# Patient Record
Sex: MALE | Race: WHITE | NOT HISPANIC OR LATINO
[De-identification: names, ages, dates, MRNs, and addresses within clinical notes are randomized per-mention and may not be internally consistent; named-entity substitution may affect disease eponyms.]

---

## 2020-10-20 ENCOUNTER — APPOINTMENT (OUTPATIENT)
Dept: INTERNAL MEDICINE | Facility: CLINIC | Age: 80
End: 2020-10-20
Payer: MEDICARE

## 2020-10-20 VITALS
SYSTOLIC BLOOD PRESSURE: 135 MMHG | WEIGHT: 177 LBS | RESPIRATION RATE: 12 BRPM | HEIGHT: 71 IN | BODY MASS INDEX: 24.78 KG/M2 | OXYGEN SATURATION: 96 % | HEART RATE: 85 BPM | DIASTOLIC BLOOD PRESSURE: 90 MMHG

## 2020-10-20 VITALS — SYSTOLIC BLOOD PRESSURE: 110 MMHG | DIASTOLIC BLOOD PRESSURE: 70 MMHG

## 2020-10-20 DIAGNOSIS — Z86.39 PERSONAL HISTORY OF OTHER ENDOCRINE, NUTRITIONAL AND METABOLIC DISEASE: ICD-10-CM

## 2020-10-20 DIAGNOSIS — Z92.3 PERSONAL HISTORY OF IRRADIATION: ICD-10-CM

## 2020-10-20 DIAGNOSIS — Z85.46 PERSONAL HISTORY OF MALIGNANT NEOPLASM OF PROSTATE: ICD-10-CM

## 2020-10-20 PROCEDURE — G0008: CPT

## 2020-10-20 PROCEDURE — 90662 IIV NO PRSV INCREASED AG IM: CPT

## 2020-10-20 PROCEDURE — 99213 OFFICE O/P EST LOW 20 MIN: CPT | Mod: 25

## 2020-10-20 NOTE — PHYSICAL EXAM
[No Acute Distress] : no acute distress [No Lymphadenopathy] : no lymphadenopathy [Thyroid Normal, No Nodules] : the thyroid was normal and there were no nodules present [No Carotid Bruits] : no carotid bruits [No Edema] : there was no peripheral edema [Normal] : soft, non-tender, non-distended, no masses palpated, no HSM and normal bowel sounds [Speech Grossly Normal] : speech grossly normal [Normal Mood] : the mood was normal [Normal Insight/Judgement] : insight and judgment were intact

## 2020-10-20 NOTE — HISTORY OF PRESENT ILLNESS
[FreeTextEntry1] : ROUTINE CLINIC FOLLOW UP [de-identified] : PATIENT W/ H/O ANXIETY FOR ROUTINE CLINIC FOLLOW UP AND FLU VACCINE , HE FEELS WELL BUT STILL C/O ANXIETY AT TIMES

## 2020-10-20 NOTE — REVIEW OF SYSTEMS
[Chest Pain] : no chest pain [Shortness Of Breath] : no shortness of breath [Orthopena] : no orthopnea [Abdominal Pain] : no abdominal pain [Negative] : Constitutional

## 2020-11-23 ENCOUNTER — APPOINTMENT (OUTPATIENT)
Dept: INTERNAL MEDICINE | Facility: CLINIC | Age: 80
End: 2020-11-23
Payer: MEDICARE

## 2020-11-23 ENCOUNTER — LABORATORY RESULT (OUTPATIENT)
Age: 80
End: 2020-11-23

## 2020-11-23 ENCOUNTER — NON-APPOINTMENT (OUTPATIENT)
Age: 80
End: 2020-11-23

## 2020-11-23 VITALS
HEIGHT: 71 IN | DIASTOLIC BLOOD PRESSURE: 83 MMHG | HEART RATE: 71 BPM | RESPIRATION RATE: 12 BRPM | BODY MASS INDEX: 24.52 KG/M2 | WEIGHT: 175.13 LBS | SYSTOLIC BLOOD PRESSURE: 124 MMHG | OXYGEN SATURATION: 98 %

## 2020-11-23 PROCEDURE — 99497 ADVNCD CARE PLAN 30 MIN: CPT | Mod: 33

## 2020-11-23 PROCEDURE — 36415 COLL VENOUS BLD VENIPUNCTURE: CPT

## 2020-11-23 PROCEDURE — G0439: CPT

## 2020-11-23 PROCEDURE — G0444 DEPRESSION SCREEN ANNUAL: CPT | Mod: 59

## 2020-11-23 PROCEDURE — 93000 ELECTROCARDIOGRAM COMPLETE: CPT | Mod: 59

## 2020-11-23 NOTE — HEALTH RISK ASSESSMENT
[0] : 2) Feeling down, depressed, or hopeless: Not at all (0) [Patient reported colonoscopy was normal] : Patient reported colonoscopy was normal [[id number]] : 0 [ColonoscopyComments] : APPROX 5 YEARS AGO [AdvancecareDate] : 11/23/2020 [FreeTextEntry4] : DISCUSSED ADVANCE CARE PLAN W/ PATIENT , FORMS GIVEN

## 2020-11-23 NOTE — PHYSICAL EXAM
[No Acute Distress] : no acute distress [PERRL] : pupils equal round and reactive to light [EOMI] : extraocular movements intact [Normal Outer Ear/Nose] : the outer ears and nose were normal in appearance [No Lymphadenopathy] : no lymphadenopathy [Thyroid Normal, No Nodules] : the thyroid was normal and there were no nodules present [Normal Rate] : normal rate  [Regular Rhythm] : with a regular rhythm [No Carotid Bruits] : no carotid bruits [No Abdominal Bruit] : a ~M bruit was not heard ~T in the abdomen [Normal Appearance] : normal in appearance [No Masses] : no palpable masses [Normal] : soft, non-tender, non-distended, no masses palpated, no HSM and normal bowel sounds [Declined Rectal Exam] : declined rectal exam [Normal Supraclavicular Nodes] : no supraclavicular lymphadenopathy [Normal Axillary Nodes] : no axillary lymphadenopathy [Normal Posterior Cervical Nodes] : no posterior cervical lymphadenopathy [Normal Anterior Cervical Nodes] : no anterior cervical lymphadenopathy [Normal Inguinal Nodes] : no inguinal lymphadenopathy [No CVA Tenderness] : no CVA  tenderness [No Focal Deficits] : no focal deficits [de-identified] : = 1-2 /6 systolic murmur at apex  [de-identified] : no ssupicious skin lesions

## 2020-11-24 ENCOUNTER — LABORATORY RESULT (OUTPATIENT)
Age: 80
End: 2020-11-24

## 2020-11-24 LAB
ALBUMIN SERPL ELPH-MCNC: 4.3 G/DL
ALP BLD-CCNC: 128 U/L
ALT SERPL-CCNC: 25 U/L
ANION GAP SERPL CALC-SCNC: 11 MMOL/L
APPEARANCE: CLEAR
AST SERPL-CCNC: 33 U/L
BACTERIA: NEGATIVE
BASOPHILS # BLD AUTO: 0 K/UL
BASOPHILS NFR BLD AUTO: 0 %
BILIRUB SERPL-MCNC: 0.8 MG/DL
BILIRUBIN URINE: NEGATIVE
BLOOD URINE: NEGATIVE
BUN SERPL-MCNC: 17 MG/DL
CALCIUM SERPL-MCNC: 9.5 MG/DL
CHLORIDE SERPL-SCNC: 102 MMOL/L
CHOLEST SERPL-MCNC: 181 MG/DL
CO2 SERPL-SCNC: 29 MMOL/L
COLOR: YELLOW
CREAT SERPL-MCNC: 1.04 MG/DL
EOSINOPHIL # BLD AUTO: 0.04 K/UL
EOSINOPHIL NFR BLD AUTO: 1.7 %
GLUCOSE QUALITATIVE U: NEGATIVE
GLUCOSE SERPL-MCNC: 99 MG/DL
HCT VFR BLD CALC: 43.4 %
HDLC SERPL-MCNC: 62 MG/DL
HGB BLD-MCNC: 14.3 G/DL
HYALINE CASTS: 0 /LPF
KETONES URINE: NEGATIVE
LDLC SERPL CALC-MCNC: 104 MG/DL
LEUKOCYTE ESTERASE URINE: NEGATIVE
LYMPHOCYTES # BLD AUTO: 0.81 K/UL
LYMPHOCYTES NFR BLD AUTO: 31.3 %
MAN DIFF?: NORMAL
MCHC RBC-ENTMCNC: 29.8 PG
MCHC RBC-ENTMCNC: 32.9 GM/DL
MCV RBC AUTO: 90.4 FL
MICROSCOPIC-UA: NORMAL
MONOCYTES # BLD AUTO: 0.23 K/UL
MONOCYTES NFR BLD AUTO: 8.7 %
NEUTROPHILS # BLD AUTO: 1.51 K/UL
NEUTROPHILS NFR BLD AUTO: 58.3 %
NITRITE URINE: NEGATIVE
NONHDLC SERPL-MCNC: 119 MG/DL
PH URINE: 6.5
PLATELET # BLD AUTO: 113 K/UL
POTASSIUM SERPL-SCNC: 4.2 MMOL/L
PROT SERPL-MCNC: 6.5 G/DL
PROTEIN URINE: NORMAL
PSA SERPL-MCNC: 1.51 NG/ML
RBC # BLD: 4.8 M/UL
RBC # FLD: 12.9 %
RED BLOOD CELLS URINE: 1 /HPF
SODIUM SERPL-SCNC: 142 MMOL/L
SPECIFIC GRAVITY URINE: 1.02
SQUAMOUS EPITHELIAL CELLS: 0 /HPF
TRIGL SERPL-MCNC: 73 MG/DL
UROBILINOGEN URINE: NORMAL
WBC # FLD AUTO: 2.59 K/UL
WHITE BLOOD CELLS URINE: 0 /HPF

## 2021-03-08 ENCOUNTER — INPATIENT (INPATIENT)
Facility: HOSPITAL | Age: 81
LOS: 2 days | Discharge: HOME | DRG: 291 | End: 2021-03-11
Attending: FAMILY MEDICINE | Admitting: FAMILY MEDICINE
Payer: MEDICARE

## 2021-03-08 ENCOUNTER — NON-APPOINTMENT (OUTPATIENT)
Age: 81
End: 2021-03-08

## 2021-03-08 VITALS
TEMPERATURE: 97 F | HEART RATE: 144 BPM | WEIGHT: 179.9 LBS | RESPIRATION RATE: 18 BRPM | OXYGEN SATURATION: 95 % | HEIGHT: 71 IN | DIASTOLIC BLOOD PRESSURE: 87 MMHG | SYSTOLIC BLOOD PRESSURE: 113 MMHG

## 2021-03-08 DIAGNOSIS — Z98.890 OTHER SPECIFIED POSTPROCEDURAL STATES: Chronic | ICD-10-CM

## 2021-03-08 DIAGNOSIS — D69.6 THROMBOCYTOPENIA, UNSPECIFIED: ICD-10-CM

## 2021-03-08 DIAGNOSIS — Z29.9 ENCOUNTER FOR PROPHYLACTIC MEASURES, UNSPECIFIED: ICD-10-CM

## 2021-03-08 DIAGNOSIS — R77.8 OTHER SPECIFIED ABNORMALITIES OF PLASMA PROTEINS: ICD-10-CM

## 2021-03-08 DIAGNOSIS — I48.91 UNSPECIFIED ATRIAL FIBRILLATION: ICD-10-CM

## 2021-03-08 DIAGNOSIS — I50.33 ACUTE ON CHRONIC DIASTOLIC (CONGESTIVE) HEART FAILURE: ICD-10-CM

## 2021-03-08 DIAGNOSIS — F41.9 ANXIETY DISORDER, UNSPECIFIED: ICD-10-CM

## 2021-03-08 DIAGNOSIS — J96.01 ACUTE RESPIRATORY FAILURE WITH HYPOXIA: ICD-10-CM

## 2021-03-08 DIAGNOSIS — Z98.89 OTHER SPECIFIED POSTPROCEDURAL STATES: Chronic | ICD-10-CM

## 2021-03-08 DIAGNOSIS — I50.9 HEART FAILURE, UNSPECIFIED: ICD-10-CM

## 2021-03-08 LAB
ALBUMIN SERPL ELPH-MCNC: 3.5 G/DL — SIGNIFICANT CHANGE UP (ref 3.3–5)
ALP SERPL-CCNC: 123 U/L — HIGH (ref 40–120)
ALT FLD-CCNC: 66 U/L — SIGNIFICANT CHANGE UP (ref 12–78)
ANION GAP SERPL CALC-SCNC: 4 MMOL/L — LOW (ref 5–17)
APTT BLD: 33.3 SEC — SIGNIFICANT CHANGE UP (ref 27.5–35.5)
AST SERPL-CCNC: 50 U/L — HIGH (ref 15–37)
BASOPHILS # BLD AUTO: 0.03 K/UL — SIGNIFICANT CHANGE UP (ref 0–0.2)
BASOPHILS NFR BLD AUTO: 0.5 % — SIGNIFICANT CHANGE UP (ref 0–2)
BILIRUB SERPL-MCNC: 0.9 MG/DL — SIGNIFICANT CHANGE UP (ref 0.2–1.2)
BUN SERPL-MCNC: 19 MG/DL — SIGNIFICANT CHANGE UP (ref 7–23)
CALCIUM SERPL-MCNC: 9.4 MG/DL — SIGNIFICANT CHANGE UP (ref 8.5–10.1)
CHLORIDE SERPL-SCNC: 106 MMOL/L — SIGNIFICANT CHANGE UP (ref 96–108)
CK MB BLD-MCNC: 2.3 % — SIGNIFICANT CHANGE UP (ref 0–3.5)
CK MB CFR SERPL CALC: 6.7 NG/ML — HIGH (ref 0–3.6)
CK MB CFR SERPL CALC: 7.1 NG/ML — HIGH (ref 0–3.6)
CK SERPL-CCNC: 288 U/L — SIGNIFICANT CHANGE UP (ref 26–308)
CO2 SERPL-SCNC: 30 MMOL/L — SIGNIFICANT CHANGE UP (ref 22–31)
CREAT SERPL-MCNC: 1.3 MG/DL — SIGNIFICANT CHANGE UP (ref 0.5–1.3)
EOSINOPHIL # BLD AUTO: 0.04 K/UL — SIGNIFICANT CHANGE UP (ref 0–0.5)
EOSINOPHIL NFR BLD AUTO: 0.7 % — SIGNIFICANT CHANGE UP (ref 0–6)
GLUCOSE SERPL-MCNC: 107 MG/DL — HIGH (ref 70–99)
HCT VFR BLD CALC: 40.6 % — SIGNIFICANT CHANGE UP (ref 39–50)
HGB BLD-MCNC: 13.6 G/DL — SIGNIFICANT CHANGE UP (ref 13–17)
IMM GRANULOCYTES NFR BLD AUTO: 0.4 % — SIGNIFICANT CHANGE UP (ref 0–1.5)
INR BLD: 1.16 RATIO — SIGNIFICANT CHANGE UP (ref 0.88–1.16)
LYMPHOCYTES # BLD AUTO: 0.87 K/UL — LOW (ref 1–3.3)
LYMPHOCYTES # BLD AUTO: 15.5 % — SIGNIFICANT CHANGE UP (ref 13–44)
MAGNESIUM SERPL-MCNC: 2.2 MG/DL — SIGNIFICANT CHANGE UP (ref 1.6–2.6)
MCHC RBC-ENTMCNC: 30.2 PG — SIGNIFICANT CHANGE UP (ref 27–34)
MCHC RBC-ENTMCNC: 33.5 GM/DL — SIGNIFICANT CHANGE UP (ref 32–36)
MCV RBC AUTO: 90.2 FL — SIGNIFICANT CHANGE UP (ref 80–100)
MONOCYTES # BLD AUTO: 0.45 K/UL — SIGNIFICANT CHANGE UP (ref 0–0.9)
MONOCYTES NFR BLD AUTO: 8 % — SIGNIFICANT CHANGE UP (ref 2–14)
NEUTROPHILS # BLD AUTO: 4.21 K/UL — SIGNIFICANT CHANGE UP (ref 1.8–7.4)
NEUTROPHILS NFR BLD AUTO: 74.9 % — SIGNIFICANT CHANGE UP (ref 43–77)
NRBC # BLD: 0 /100 WBCS — SIGNIFICANT CHANGE UP (ref 0–0)
NT-PROBNP SERPL-SCNC: 4078 PG/ML — HIGH (ref 0–450)
PLATELET # BLD AUTO: 115 K/UL — LOW (ref 150–400)
POTASSIUM SERPL-MCNC: 4.3 MMOL/L — SIGNIFICANT CHANGE UP (ref 3.5–5.3)
POTASSIUM SERPL-SCNC: 4.3 MMOL/L — SIGNIFICANT CHANGE UP (ref 3.5–5.3)
PROT SERPL-MCNC: 6.9 G/DL — SIGNIFICANT CHANGE UP (ref 6–8.3)
PROTHROM AB SERPL-ACNC: 13.5 SEC — SIGNIFICANT CHANGE UP (ref 10.6–13.6)
RBC # BLD: 4.5 M/UL — SIGNIFICANT CHANGE UP (ref 4.2–5.8)
RBC # FLD: 14.6 % — HIGH (ref 10.3–14.5)
SARS-COV-2 RNA SPEC QL NAA+PROBE: SIGNIFICANT CHANGE UP
SODIUM SERPL-SCNC: 140 MMOL/L — SIGNIFICANT CHANGE UP (ref 135–145)
TROPONIN I SERPL-MCNC: 0.09 NG/ML — HIGH (ref 0.01–0.04)
TROPONIN I SERPL-MCNC: 0.09 NG/ML — HIGH (ref 0.01–0.04)
TSH SERPL-MCNC: 1.61 UIU/ML — SIGNIFICANT CHANGE UP (ref 0.36–3.74)
WBC # BLD: 5.62 K/UL — SIGNIFICANT CHANGE UP (ref 3.8–10.5)
WBC # FLD AUTO: 5.62 K/UL — SIGNIFICANT CHANGE UP (ref 3.8–10.5)

## 2021-03-08 PROCEDURE — 71275 CT ANGIOGRAPHY CHEST: CPT | Mod: 26

## 2021-03-08 PROCEDURE — 93010 ELECTROCARDIOGRAM REPORT: CPT

## 2021-03-08 PROCEDURE — 99223 1ST HOSP IP/OBS HIGH 75: CPT | Mod: GC,AI

## 2021-03-08 PROCEDURE — 71045 X-RAY EXAM CHEST 1 VIEW: CPT | Mod: 26

## 2021-03-08 PROCEDURE — 99285 EMERGENCY DEPT VISIT HI MDM: CPT | Mod: CS

## 2021-03-08 RX ORDER — ATORVASTATIN CALCIUM 80 MG/1
40 TABLET, FILM COATED ORAL AT BEDTIME
Refills: 0 | Status: DISCONTINUED | OUTPATIENT
Start: 2021-03-08 | End: 2021-03-11

## 2021-03-08 RX ORDER — DILTIAZEM HCL 120 MG
10 CAPSULE, EXT RELEASE 24 HR ORAL ONCE
Refills: 0 | Status: COMPLETED | OUTPATIENT
Start: 2021-03-08 | End: 2021-03-08

## 2021-03-08 RX ORDER — FUROSEMIDE 40 MG
40 TABLET ORAL ONCE
Refills: 0 | Status: COMPLETED | OUTPATIENT
Start: 2021-03-08 | End: 2021-03-08

## 2021-03-08 RX ORDER — METOPROLOL TARTRATE 50 MG
25 TABLET ORAL ONCE
Refills: 0 | Status: COMPLETED | OUTPATIENT
Start: 2021-03-08 | End: 2021-03-08

## 2021-03-08 RX ORDER — ENOXAPARIN SODIUM 100 MG/ML
80 INJECTION SUBCUTANEOUS ONCE
Refills: 0 | Status: COMPLETED | OUTPATIENT
Start: 2021-03-08 | End: 2021-03-08

## 2021-03-08 RX ORDER — ASPIRIN/CALCIUM CARB/MAGNESIUM 324 MG
325 TABLET ORAL DAILY
Refills: 0 | Status: DISCONTINUED | OUTPATIENT
Start: 2021-03-08 | End: 2021-03-10

## 2021-03-08 RX ORDER — ENOXAPARIN SODIUM 100 MG/ML
80 INJECTION SUBCUTANEOUS
Refills: 0 | Status: DISCONTINUED | OUTPATIENT
Start: 2021-03-09 | End: 2021-03-10

## 2021-03-08 RX ORDER — SODIUM CHLORIDE 9 MG/ML
1000 INJECTION INTRAMUSCULAR; INTRAVENOUS; SUBCUTANEOUS ONCE
Refills: 0 | Status: COMPLETED | OUTPATIENT
Start: 2021-03-08 | End: 2021-03-08

## 2021-03-08 RX ORDER — ACETAMINOPHEN 500 MG
650 TABLET ORAL EVERY 4 HOURS
Refills: 0 | Status: DISCONTINUED | OUTPATIENT
Start: 2021-03-08 | End: 2021-03-11

## 2021-03-08 RX ORDER — ALBUTEROL 90 UG/1
2 AEROSOL, METERED ORAL EVERY 6 HOURS
Refills: 0 | Status: DISCONTINUED | OUTPATIENT
Start: 2021-03-08 | End: 2021-03-11

## 2021-03-08 RX ORDER — METOPROLOL TARTRATE 50 MG
25 TABLET ORAL THREE TIMES A DAY
Refills: 0 | Status: DISCONTINUED | OUTPATIENT
Start: 2021-03-08 | End: 2021-03-09

## 2021-03-08 RX ADMIN — SODIUM CHLORIDE 1000 MILLILITER(S): 9 INJECTION INTRAMUSCULAR; INTRAVENOUS; SUBCUTANEOUS at 15:25

## 2021-03-08 RX ADMIN — ATORVASTATIN CALCIUM 40 MILLIGRAM(S): 80 TABLET, FILM COATED ORAL at 23:03

## 2021-03-08 RX ADMIN — Medication 10 MILLIGRAM(S): at 23:03

## 2021-03-08 RX ADMIN — ENOXAPARIN SODIUM 80 MILLIGRAM(S): 100 INJECTION SUBCUTANEOUS at 18:07

## 2021-03-08 RX ADMIN — Medication 25 MILLIGRAM(S): at 20:01

## 2021-03-08 RX ADMIN — SODIUM CHLORIDE 1000 MILLILITER(S): 9 INJECTION INTRAMUSCULAR; INTRAVENOUS; SUBCUTANEOUS at 16:25

## 2021-03-08 RX ADMIN — Medication 10 MILLIGRAM(S): at 15:25

## 2021-03-08 RX ADMIN — Medication 40 MILLIGRAM(S): at 18:07

## 2021-03-08 RX ADMIN — ALBUTEROL 2 PUFF(S): 90 AEROSOL, METERED ORAL at 23:03

## 2021-03-08 RX ADMIN — Medication 10 MILLIGRAM(S): at 16:15

## 2021-03-08 RX ADMIN — Medication 10 MILLIGRAM(S): at 15:57

## 2021-03-08 NOTE — ED PROVIDER NOTE - CARE PLAN
Principal Discharge DX:	Rapid atrial fibrillation   Principal Discharge DX:	Rapid atrial fibrillation  Secondary Diagnosis:	Acute on chronic congestive heart failure, unspecified heart failure type

## 2021-03-08 NOTE — H&P ADULT - PROBLEM SELECTOR PLAN 3
Patient presenting with elevated troponin 0.085  - Patient admitting pleuritic chest pain  - Started on FD aspirin and atorvastatin  - Elevated trop may be elevated 2/2 demand from hypoxia and acute decompensated CHF exacerbation  - F/u full set of cardiac enzymes at 2100 Patient presenting with SYLVESTER x2 weeks and new pleuritic chest pain  - On examination, patient noted to desat to 82%  - Placed on 4L NC with O2 saturation 94%  - Continue supplemental oxygen and titrate down as tolerated by patient  - Wheezes appreciated on exam, start Albuterol inhaler q6h  - Concern for PE, will check CT angio  - Obtain ABG  - Pulmonology (Dr Olea) consulted, f/u recs Patient presenting with SYLVESTER x2 weeks and new pleuritic chest pain, sec to pleural effusion vs r/o PE  - On examination, patient noted to desat to 82%  - Placed on 4L NC with O2 saturation 94%  - Continue supplemental oxygen and titrate down as tolerated by patient  - Wheezes appreciated on exam, start Albuterol inhaler q6h  - Concern for PE, will check CT angio  - Obtain ABG  - Pulmonology (Dr Olea) consulted, f/u recs

## 2021-03-08 NOTE — ED ADULT NURSE NOTE - NSIMPLEMENTINTERV_GEN_ALL_ED
Implemented All Universal Safety Interventions:  [place] to call system. Call bell, personal items and telephone within reach. Instruct patient to call for assistance. Room bathroom lighting operational. Non-slip footwear when patient is off stretcher. Physically safe environment: no spills, clutter or unnecessary equipment. Stretcher in lowest position, wheels locked, appropriate side rails in place.

## 2021-03-08 NOTE — H&P ADULT - NSHPREVIEWOFSYSTEMS_GEN_ALL_CORE
CONSTITUTIONAL: denies fever, chills, fatigue, weakness  HEENT: denies blurred vision, sore throat  SKIN: denies new lesions, rash  CARDIOVASCULAR: admits chest pain. Denies chest pressure, palpitations  RESPIRATORY: admits shortness of breath and cough. Denies sputum production  GASTROINTESTINAL: admits abdominal distension. denies nausea, vomiting, diarrhea, abdominal pain  GENITOURINARY: denies dysuria, discharge  NEUROLOGICAL: denies numbness, headache, focal weakness  MUSCULOSKELETAL: denies new joint pain, muscle aches

## 2021-03-08 NOTE — H&P ADULT - PROBLEM SELECTOR PLAN 2
Patient presenting with SYLVESTER for 2 weeks  - CXR shows Nonspecific bibasilar interstitial prominence and trace effusions as described  - On examination, patient noted to desat to 82%  - Placed on 4L NC with O2 saturation 94%  - Continue supplemental oxygen and titrate down as tolerated by patient  - Wheezes appreciated on exam, start Albuterol inhaler q6h  - Received 40mg IVP Lasix, continue daily Lasix  - Strict Is/Os, 1L fluid restriction  - Received 1L NS bolus in ED, caution further IVF in setting of CHF exacerbation  - F/u repeat CXR in AM  - ECHO in 2015 from Dr. Solano's group showed EF 69%, mild MR, diastolic LV dysfunction  - F/u repeat TTE Patient presenting with SYLVESTER for 2 weeks  - CXR shows Nonspecific bibasilar interstitial prominence and trace effusions as described  - Received 40mg IVP Lasix, continue daily Lasix  - Strict Is/Os, 1L fluid restriction  - Received 1L NS bolus in ED, caution further IVF in setting of CHF exacerbation  - F/u repeat CXR in AM  - ECHO in 2015 from Dr. Solano's group showed EF 69%, mild MR, diastolic LV dysfunction  - F/u repeat TTE

## 2021-03-08 NOTE — H&P ADULT - PROBLEM SELECTOR PLAN 1
Patient presenting with rapid heart rate since this afternoon, found to be in new onset AFib with RVR  - Admit to Tele  - EKG shows Afib w/ RVR rate 143, QTc 456  - S/p Cardizem 10mg IVP x3   - On examination, HR in 110s but with several spikes up to 130s  - Start Lopressor 25mg TID  - CHADS-VASC of 3 points, start FD Lovenox 80mg BID   - Monitor on remote tele  - TTE ordered, f/u results   - Cardiology (Dr. Richard) consulted, recs appreciated Patient presenting with rapid heart rate since this afternoon, found to be in new onset AFib with RVR  - Admit to Tele  - EKG shows Afib w/ RVR rate 143, QTc 456  - S/p Cardizem 10mg IVP x3   - On examination, HR in 110s but with several spikes up to 130s  - Start Lopressor 25mg TID  - CHADS-VASC of 3 points, start FD Lovenox 80mg BID   - TSH WNL  - Monitor on remote tele  - TTE ordered, f/u results   - Cardiology (Dr. Richard) consulted, recs appreciated Patient presenting with rapid heart rate since this afternoon, found to be in new onset AFib with RVR  - Admit to Tele  - EKG shows Afib w/ RVR rate 143, QTc 456  - S/p Cardizem 10mg IVP x3   - On examination, HR in 110s but with several spikes up to 130s  - Start Lopressor 25mg TID  - CHADS-VASC of 3 points, start FD Lovenox 80mg BID   - TSH WNL  - Monitor on remote tele  - TTE ordered, f/u results   - Cardiology (Dr. Richard) consulted, recs appreciated  - Heme (Dr. Arias's group) consulted for long term AC recommendations, f/u recs

## 2021-03-08 NOTE — CONSULT NOTE ADULT - ASSESSMENT
80M with no significant PMHx who presents with new onset AF with RVR, positive trop, and acute CHF by CXR/symptoms.    Suggest:    1.  IV lasix 40mg daily  2. Get TTE  3. Will need outpatient stress test  4. Aspirin 325mg daily, first dose now, Start Lipitor 40mg daily  5. Metoprolol 25 q8  6. Start Lovenox full dose twice daily.   7. Will follow

## 2021-03-08 NOTE — H&P ADULT - PROBLEM SELECTOR PLAN 6
Lovenox 80mg BID  IMPROVE VTE Individual Risk Assessment        RISK                                                          Points  [  ] Previous VTE                                                3  [  ] Thrombophilia                                             2  [  ] Lower limb paralysis                                   2        (unable to hold up >15 seconds)    [  ] Current Cancer                                             2         (within 6 months)  [  ] Immobilization > 24 hrs                              1  [  ] ICU/CCU stay > 24 hours                             1  [ x ] Age > 60                                                         1    IMPROVE VTE Score:         [    1     ] Patient presenting with platelets 115  - Per chart review, CBC in 11/2020 at PMD's office also showed platelets of 113   - ? ITP history  - Monitor CBC closely while on Lovenox

## 2021-03-08 NOTE — H&P ADULT - NSICDXFAMILYHX_GEN_ALL_CORE_FT
FAMILY HISTORY:  Father  Still living? No  Family history of diabetes mellitus, Age at diagnosis: Age Unknown    Mother  Still living? No  Family history of Alzheimer's disease, Age at diagnosis: Age Unknown

## 2021-03-08 NOTE — H&P ADULT - NSICDXPASTSURGICALHX_GEN_ALL_CORE_FT
PAST SURGICAL HISTORY:  History of hydrocelectomy 2015    S/P excision of lipoma right hip 20 years ago

## 2021-03-08 NOTE — H&P ADULT - NSHPSOCIALHISTORY_GEN_ALL_CORE
Alcohol: Admits drinking two drinks on special occasions  Tobacco: Admits cigar use 10 years ago  Drugs: Denies past or present use    Lives with wife  Ambulates without assistance  Performs ADLs independently    Code Status:

## 2021-03-08 NOTE — H&P ADULT - NSICDXPASTMEDICALHX_GEN_ALL_CORE_FT
PAST MEDICAL HISTORY:  Anxiety     Left hydrocele     Prostate cancer Dx 2012 s/p Radiation    Tinnitus

## 2021-03-08 NOTE — H&P ADULT - NSHPPHYSICALEXAM_GEN_ALL_CORE
T(C): 36.1 (03-08-21 @ 14:46), Max: 36.1 (03-08-21 @ 14:46)  HR: 109 (03-08-21 @ 18:05) (94 - 144)  BP: 113/85 (03-08-21 @ 18:05) (103/80 - 127/82)  RR: 16 (03-08-21 @ 18:05) (16 - 18)  SpO2: 93% (03-08-21 @ 18:05) (93% - 95%)    GENERAL: patient appears well, no acute distress, appropriate, pleasant  EYES: sclera clear, no exudates  ENMT: oropharynx clear without erythema, no exudates, moist mucous membranes  NECK: supple, soft, no thyromegaly noted  LUNGS: diffuse wheezes appreciated in B/L lung bases  HEART: soft S1/S2, irregular rate and rhythm, holosystolic murmur appreciated at apex, trace B/L pitting edema  GASTROINTESTINAL: abdomen is soft, nontender, +Distension, normoactive bowel sounds, no palpable masses  INTEGUMENT: good skin turgor, no lesions noted  MUSCULOSKELETAL: no clubbing or cyanosis, no obvious deformity  NEUROLOGIC: awake, alert, oriented x3, CN II-XII grossly intact good muscle tone in 4 extremities, no obvious sensory deficits

## 2021-03-08 NOTE — ED PROVIDER NOTE - OBJECTIVE STATEMENT
80 male presents to ER with wife, patient states for the past 2 weeks he has had chest discomfort, pressure like feeling, cough, today was walking and felt shortness of breath, wife took his pulse and it was high, called PMD Dr. Orourke and told to go to ER.

## 2021-03-08 NOTE — PATIENT PROFILE ADULT - VISION (WITH CORRECTIVE LENSES IF THE PATIENT USUALLY WEARS THEM):
Glasses are at home/Partially impaired: cannot see medication labels or newsprint, but can see obstacles in path, and the surrounding layout; can count fingers at arm's length

## 2021-03-08 NOTE — H&P ADULT - ATTENDING COMMENTS
79yo male with PMH of anxiety, HFpEF (EF 69% in 2015, diastolic dysfunction) and prostate cancer s/p radiation (2012) presents with a chief complaint of rapid heart rate admitted for new onset AFib with RVR and acute on chronic diastolic  congestive heart failure exacerbation, r/o PE Plan: apprec pulm, cardio and hemat recs, monitor on tele, pt anxious about missing covid vaccine appt on wednesday, rate and rhythm control, f/u CTA r/o PE, cont lovenox, monitor plts, monitor clinical course

## 2021-03-08 NOTE — ED ADULT NURSE NOTE - OBJECTIVE STATEMENT
received pt in bed #18b Pt A&O c/o not being able to take a deep breath x 3 weeks Wife happened to check pts heart rate & it was high CM placed w/ rapid afib EKG done

## 2021-03-08 NOTE — H&P ADULT - PROBLEM SELECTOR PROBLEM 2
Acute on chronic congestive heart failure, unspecified heart failure type Acute on chronic diastolic congestive heart failure

## 2021-03-08 NOTE — H&P ADULT - PROBLEM SELECTOR PLAN 5
Patient presenting with platelets 113  - Per chart review, CBC in 11/2020 at PMD's office also showed platelets of 113   - Monitor CBC closely while on Lovenox Chronic  - Continue home Paxil 10mg inpatient

## 2021-03-08 NOTE — H&P ADULT - HISTORY OF PRESENT ILLNESS
79yo male with PMH of anxiety and prostate cancer s/p radiation (2012) presents with a chief complaint of anxiety.     In the ED:  Vitals: Temp 97F |  | /87 | RR 18 | SpO2 95% on RA  Labs: Platelets 115, Alk phos, AST 50, CKMB 7.1, Trop 0.085, BNP 4078  CXR: Nonspecific bibasilar interstitial prominence and trace effusions as described. Recommend clinical correlation and follow-up  EKG:   Received: Cardizem 10mg IVP x3, Lasix 40mg IVP, 1L NS bolus 79yo male with PMH of anxiety and prostate cancer s/p radiation (2012) presents with a chief complaint of rapid heart rate.     In the ED:  Vitals: Temp 97F |  | /87 | RR 18 | SpO2 95% on RA  Labs: Platelets 115, Alk phos, AST 50, CKMB 7.1, Trop 0.085, BNP 4078  CXR: Nonspecific bibasilar interstitial prominence and trace effusions as described. Recommend clinical correlation and follow-up  EKG: Afib w/ RVR rate 143, QTc 456  Received: Cardizem 10mg IVP x3, Lasix 40mg IVP, 1L NS bolus 81yo male with PMH of anxiety, HFpEF (EF 69% in 2015) and prostate cancer s/p radiation (2012) presents with a chief complaint of rapid heart rate. Wife states that two weeks ago, the patient was shoveling snow and felt discomfort in his chest and noted he was having difficulty catching his breath. He also developed a cough and wife began giving him Mucinex and cough syrup, which did not improve the cough. Today, she noticed that on their daily walk the patient was walking slowly and was having difficulty breathing. She decided to check his blood pressure and the machine was not able to read his blood pressure. She then checked his pulse ox and the pulse oximeter stated that his HR was 145 and his oxygen saturation was in the low 90s. She then called her cardiologist, Dr. Richard (who has not seen the patient in the past) and he recommend that the patient come to the ED. Patient still admits shortness of breath during examination and admits pleuritic chest pain. Patient feels like his shortness of breath has worsened since being in the ED and he also admits diaphoresis. He denies lower extremity edema, however admits abdominal distension. Patient does note orthopnea and sleeps with two pillows at night. He denies any dizziness, palpitations or syncope. Patient states he is supposed to get his second COVID-19 vaccine on 3/10 and does not want to miss his appointment.    In the ED:  Vitals: Temp 97F |  | /87 | RR 18 | SpO2 95% on RA  Labs: Platelets 115, Alk phos, AST 50, CKMB 7.1, Trop 0.085, BNP 4078  CXR: Nonspecific bibasilar interstitial prominence and trace effusions as described. Recommend clinical correlation and follow-up  EKG: Afib w/ RVR rate 143, QTc 456  Received: Cardizem 10mg IVP x3, Lasix 40mg IVP, 1L NS bolus 79yo male with PMH of anxiety, HFpEF (EF 69% in 2015) and prostate cancer s/p radiation (2012) presents with a chief complaint of rapid heart rate. Wife states that two weeks ago, the patient was shoveling snow and felt discomfort in his chest and noted he was having difficulty catching his breath. He also developed a cough and wife began giving him Mucinex and cough syrup, which did not improve the cough. Today, she noticed that on their daily walk the patient was walking slowly and was having difficulty breathing. She decided to check his blood pressure and the machine was not able to read his blood pressure. She then checked his pulse ox and the pulse oximeter stated that his HR was 145 and his oxygen saturation was in the low 90s. She then called her cardiologist, Dr. Richard (who has not seen the patient in the past) and he recommend that the patient come to the ED. Patient still admits shortness of breath during examination and admits pleuritic chest pain. Patient feels like his shortness of breath has worsened since being in the ED and he also admits diaphoresis. He denies lower extremity edema, however admits abdominal distension. Patient does note orthopnea and sleeps with two pillows at night. He denies any dizziness, palpitations or syncope. Patient states he is supposed to get his second COVID-19 vaccine on 3/10 and does not want to miss his appointment.    In the ED:  Vitals: Temp 97F |  | /87 | RR 18 | SpO2 95% on RA  Labs: Platelets 115, Alk phos 123, CKMB 7.1, Trop 0.085, BNP 4078  CXR: Nonspecific bibasilar interstitial prominence and trace effusions as described. Recommend clinical correlation and follow-up  EKG: Afib w/ RVR rate 143, QTc 456  Received: Cardizem 10mg IVP x3, Lasix 40mg IVP, 1L NS bolus

## 2021-03-08 NOTE — H&P ADULT - ASSESSMENT
81yo male with PMH of anxiety, HFpEF (EF 69% in 2015) and prostate cancer s/p radiation (2012) presents with a chief complaint of rapid heart rate admitted for AFib with RVR and acute decompensated heart failure exacerbation. 81yo male with PMH of anxiety, HFpEF (EF 69% in 2015) and prostate cancer s/p radiation (2012) presents with a chief complaint of rapid heart rate admitted for new onset AFib with RVR and acute decompensated heart failure exacerbation. 81yo male with PMH of anxiety, HFpEF (EF 69% in 2015) and prostate cancer s/p radiation (2012) presents with a chief complaint of rapid heart rate admitted for new onset AFib with RVR and acute decompensated heart failure exacerbation.   81yo male with PMH of anxiety, HFpEF (EF 69% in 2015, diastolic dysfunction) and prostate cancer s/p radiation (2012) presents with a chief complaint of rapid heart rate admitted for new onset AFib with RVR and acute on chronic diastolic  congestive heart failure exacerbation, r/o PE

## 2021-03-08 NOTE — H&P ADULT - PROBLEM SELECTOR PLAN 4
Chronic  - Continue home Paxil 10mg inpatient Patient presenting with elevated troponin 0.085  - Patient admitting pleuritic chest pain  - Started on FD aspirin and atorvastatin  - Concern for ACS however elevated trop may be elevated 2/2 demand from hypoxia and acute decompensated CHF exacerbation  - F/u full set of cardiac enzymes at 2100 Patient presenting with elevated troponin 0.085  - Patient admitting pleuritic chest pain  - Started on FD aspirin and atorvastatin  - Concern for ACS however elevated trop may be elevated 2/2 demand from hypoxia and acute decompensated CHF exacerbation  - F/u full set of cardiac enzymes at 2100  -monitor on telemetry

## 2021-03-08 NOTE — H&P ADULT - PROBLEM SELECTOR PLAN 7
Lovenox 80mg BID  IMPROVE VTE Individual Risk Assessment        RISK                                                          Points  [  ] Previous VTE                                                3  [  ] Thrombophilia                                             2  [  ] Lower limb paralysis                                   2        (unable to hold up >15 seconds)    [  ] Current Cancer                                             2         (within 6 months)  [  ] Immobilization > 24 hrs                              1  [  ] ICU/CCU stay > 24 hours                             1  [ x ] Age > 60                                                         1    IMPROVE VTE Score:         [    1     ]

## 2021-03-08 NOTE — CHART NOTE - NSCHARTNOTEFT_GEN_A_CORE
Called by RN for patient in rapid afib. Patient just moved to [facility], was admitted with rapid afib HR in 150s. HR is now improving to 120s. Patient is s/p Cardizem 10mg IVP x3 in ED. CHADS-VASC of 3 points. Patient started on Lopressor 25mg TID and FD Lovenox 80mg BID.   Cardiology HEATHER group on board. Called by RN for patient in rapid afib. Patient just moved to [facility], was admitted with rapid afib HR in 150s. HR is now improving to 120s. Patient is s/p Cardizem 10mg IVP x3 in ED. CHADS-VASC of 3 points. Patient started on Lopressor 25mg TID and FD Lovenox 80mg BID.   /76 stable   Cardiology HEATHER group on board.  Will continue to monitor. Will consider additional BB if HR not improved in 1 hour and BP withstanding      Addendum 23:30  Patient HR continues to improve, now 109.  No need for further intervention at this time.

## 2021-03-09 ENCOUNTER — APPOINTMENT (OUTPATIENT)
Dept: INTERNAL MEDICINE | Facility: CLINIC | Age: 81
End: 2021-03-09

## 2021-03-09 LAB
ALBUMIN SERPL ELPH-MCNC: 3.4 G/DL — SIGNIFICANT CHANGE UP (ref 3.3–5)
ALP SERPL-CCNC: 106 U/L — SIGNIFICANT CHANGE UP (ref 40–120)
ALT FLD-CCNC: 61 U/L — SIGNIFICANT CHANGE UP (ref 12–78)
ANION GAP SERPL CALC-SCNC: 7 MMOL/L — SIGNIFICANT CHANGE UP (ref 5–17)
AST SERPL-CCNC: 38 U/L — HIGH (ref 15–37)
BASOPHILS # BLD AUTO: 0.01 K/UL — SIGNIFICANT CHANGE UP (ref 0–0.2)
BASOPHILS NFR BLD AUTO: 0.2 % — SIGNIFICANT CHANGE UP (ref 0–2)
BILIRUB SERPL-MCNC: 1.3 MG/DL — HIGH (ref 0.2–1.2)
BUN SERPL-MCNC: 22 MG/DL — SIGNIFICANT CHANGE UP (ref 7–23)
CALCIUM SERPL-MCNC: 9.1 MG/DL — SIGNIFICANT CHANGE UP (ref 8.5–10.1)
CHLORIDE SERPL-SCNC: 105 MMOL/L — SIGNIFICANT CHANGE UP (ref 96–108)
CK MB BLD-MCNC: 2.3 % — SIGNIFICANT CHANGE UP (ref 0–3.5)
CK MB CFR SERPL CALC: 5.6 NG/ML — HIGH (ref 0–3.6)
CK SERPL-CCNC: 247 U/L — SIGNIFICANT CHANGE UP (ref 26–308)
CO2 SERPL-SCNC: 29 MMOL/L — SIGNIFICANT CHANGE UP (ref 22–31)
CREAT SERPL-MCNC: 1.2 MG/DL — SIGNIFICANT CHANGE UP (ref 0.5–1.3)
EOSINOPHIL # BLD AUTO: 0 K/UL — SIGNIFICANT CHANGE UP (ref 0–0.5)
EOSINOPHIL NFR BLD AUTO: 0 % — SIGNIFICANT CHANGE UP (ref 0–6)
GLUCOSE SERPL-MCNC: 134 MG/DL — HIGH (ref 70–99)
HCT VFR BLD CALC: 42.7 % — SIGNIFICANT CHANGE UP (ref 39–50)
HGB BLD-MCNC: 13.8 G/DL — SIGNIFICANT CHANGE UP (ref 13–17)
IMM GRANULOCYTES NFR BLD AUTO: 0.2 % — SIGNIFICANT CHANGE UP (ref 0–1.5)
LYMPHOCYTES # BLD AUTO: 0.6 K/UL — LOW (ref 1–3.3)
LYMPHOCYTES # BLD AUTO: 9.6 % — LOW (ref 13–44)
MCHC RBC-ENTMCNC: 29.7 PG — SIGNIFICANT CHANGE UP (ref 27–34)
MCHC RBC-ENTMCNC: 32.3 GM/DL — SIGNIFICANT CHANGE UP (ref 32–36)
MCV RBC AUTO: 91.8 FL — SIGNIFICANT CHANGE UP (ref 80–100)
MONOCYTES # BLD AUTO: 0.32 K/UL — SIGNIFICANT CHANGE UP (ref 0–0.9)
MONOCYTES NFR BLD AUTO: 5.1 % — SIGNIFICANT CHANGE UP (ref 2–14)
NEUTROPHILS # BLD AUTO: 5.34 K/UL — SIGNIFICANT CHANGE UP (ref 1.8–7.4)
NEUTROPHILS NFR BLD AUTO: 84.9 % — HIGH (ref 43–77)
NRBC # BLD: 0 /100 WBCS — SIGNIFICANT CHANGE UP (ref 0–0)
PLATELET # BLD AUTO: 122 K/UL — LOW (ref 150–400)
POTASSIUM SERPL-MCNC: 4 MMOL/L — SIGNIFICANT CHANGE UP (ref 3.5–5.3)
POTASSIUM SERPL-SCNC: 4 MMOL/L — SIGNIFICANT CHANGE UP (ref 3.5–5.3)
PROT SERPL-MCNC: 6.8 G/DL — SIGNIFICANT CHANGE UP (ref 6–8.3)
RBC # BLD: 4.65 M/UL — SIGNIFICANT CHANGE UP (ref 4.2–5.8)
RBC # FLD: 14.8 % — HIGH (ref 10.3–14.5)
SARS-COV-2 IGG SERPL QL IA: NEGATIVE — SIGNIFICANT CHANGE UP
SARS-COV-2 IGM SERPL IA-ACNC: 0.07 INDEX — SIGNIFICANT CHANGE UP
SODIUM SERPL-SCNC: 141 MMOL/L — SIGNIFICANT CHANGE UP (ref 135–145)
TROPONIN I SERPL-MCNC: 0.08 NG/ML — HIGH (ref 0.01–0.04)
WBC # BLD: 6.28 K/UL — SIGNIFICANT CHANGE UP (ref 3.8–10.5)
WBC # FLD AUTO: 6.28 K/UL — SIGNIFICANT CHANGE UP (ref 3.8–10.5)

## 2021-03-09 PROCEDURE — 71045 X-RAY EXAM CHEST 1 VIEW: CPT | Mod: 26

## 2021-03-09 PROCEDURE — 99233 SBSQ HOSP IP/OBS HIGH 50: CPT

## 2021-03-09 RX ORDER — FUROSEMIDE 40 MG
40 TABLET ORAL ONCE
Refills: 0 | Status: COMPLETED | OUTPATIENT
Start: 2021-03-09 | End: 2021-03-09

## 2021-03-09 RX ORDER — METOPROLOL TARTRATE 50 MG
50 TABLET ORAL THREE TIMES A DAY
Refills: 0 | Status: DISCONTINUED | OUTPATIENT
Start: 2021-03-09 | End: 2021-03-11

## 2021-03-09 RX ORDER — FUROSEMIDE 40 MG
40 TABLET ORAL EVERY 24 HOURS
Refills: 0 | Status: DISCONTINUED | OUTPATIENT
Start: 2021-03-09 | End: 2021-03-11

## 2021-03-09 RX ADMIN — Medication 50 MILLIGRAM(S): at 13:52

## 2021-03-09 RX ADMIN — Medication 25 MILLIGRAM(S): at 00:02

## 2021-03-09 RX ADMIN — ALBUTEROL 2 PUFF(S): 90 AEROSOL, METERED ORAL at 05:14

## 2021-03-09 RX ADMIN — Medication 10 MILLIGRAM(S): at 22:05

## 2021-03-09 RX ADMIN — Medication 40 MILLIGRAM(S): at 13:52

## 2021-03-09 RX ADMIN — Medication 40 MILLIGRAM(S): at 19:39

## 2021-03-09 RX ADMIN — ALBUTEROL 2 PUFF(S): 90 AEROSOL, METERED ORAL at 19:42

## 2021-03-09 RX ADMIN — ATORVASTATIN CALCIUM 40 MILLIGRAM(S): 80 TABLET, FILM COATED ORAL at 22:05

## 2021-03-09 RX ADMIN — Medication 50 MILLIGRAM(S): at 22:05

## 2021-03-09 RX ADMIN — ENOXAPARIN SODIUM 80 MILLIGRAM(S): 100 INJECTION SUBCUTANEOUS at 05:14

## 2021-03-09 RX ADMIN — Medication 25 MILLIGRAM(S): at 05:14

## 2021-03-09 RX ADMIN — ALBUTEROL 2 PUFF(S): 90 AEROSOL, METERED ORAL at 13:58

## 2021-03-09 RX ADMIN — Medication 325 MILLIGRAM(S): at 13:52

## 2021-03-09 RX ADMIN — ENOXAPARIN SODIUM 80 MILLIGRAM(S): 100 INJECTION SUBCUTANEOUS at 19:40

## 2021-03-09 NOTE — PROGRESS NOTE ADULT - ASSESSMENT
80M with h/o ITP and prostate CA who presents with new onset AF with RVR, positive trop, and acute CHF by CXR/symptoms.    Suggest:    1.  IV lasix 40mg daily follow up CXR today  2. Get TTE  3. Will need outpatient stress test  4. Aspirin 325mg daily, first dose now, Start Lipitor 40mg daily  5. Increase Metoprolol 50 q8  6. Continue with Lovenox full dose twice daily.   7. Will follow 80M with h/o ITP and prostate CA who presents with new onset AF with RVR, positive trop, and acute CHF by CXR/symptoms.    ECHO - Very limited study,  shows normal LVEF - moderate to severe MR/TR       Suggest:    1.  c/w IV lasix 40mg daily follow up CXR today  2. Get TTE  3. Will need outpatient stress test  4. Aspirin 325mg daily, first dose now, Start Lipitor 40mg daily  5. Increase Metoprolol 50 q8  6. Continue with Lovenox full dose twice daily.   7. Will follow

## 2021-03-09 NOTE — PROGRESS NOTE ADULT - SUBJECTIVE AND OBJECTIVE BOX
Patient is a 80y old  Male who presents with a chief complaint of Rapid AFib, Acute decompensated CHF exacerbation (09 Mar 2021 11:17)      INTERVAL HPI/OVERNIGHT EVENTS:  Patient seen and examined. He feels mildly improved from yesterday. Anxious to get up and move. He reports difficulty with deep inhalation, but otherwise no chest pain or palpitations. Remains in afib with rates in low 100s. He is on 3-4L/min NC, and appears to be breathing comfortably.     MEDICATIONS  (STANDING):  ALBUTerol    90 MICROgram(s) HFA Inhaler 2 Puff(s) Inhalation every 6 hours  aspirin 325 milliGRAM(s) Oral daily  atorvastatin 40 milliGRAM(s) Oral at bedtime  enoxaparin Injectable 80 milliGRAM(s) SubCutaneous two times a day  furosemide   Injectable 40 milliGRAM(s) IV Push every 24 hours  metoprolol tartrate 50 milliGRAM(s) Oral three times a day  PARoxetine 10 milliGRAM(s) Oral at bedtime    MEDICATIONS  (PRN):  acetaminophen   Tablet .. 650 milliGRAM(s) Oral every 4 hours PRN Mild Pain (1 - 3)      Allergies    No Known Allergies    Intolerances        REVIEW OF SYSTEMS:  as per HPI    Vital Signs Last 24 Hrs  T(C): 36.8 (09 Mar 2021 04:55), Max: 36.8 (09 Mar 2021 04:55)  T(F): 98.2 (09 Mar 2021 04:55), Max: 98.2 (09 Mar 2021 04:55)  HR: 112 (09 Mar 2021 04:55) (94 - 144)  BP: 101/74 (09 Mar 2021 04:55) (101/74 - 127/82)  BP(mean): --  RR: 16 (09 Mar 2021 04:55) (16 - 18)  SpO2: 93% (09 Mar 2021 04:55) (93% - 96%)    PHYSICAL EXAM:  GENERAL: NAD  HEENT:  anicteric, moist mucous membranes  CHEST/LUNG:  few crackles at bases bilaterally.  HEART:  irregular, +systolic murmur  ABDOMEN:  BS+, soft, nontender, nondistended  EXTREMITIES: no edema, cyanosis, or calf tenderness  NERVOUS SYSTEM: answers questions and follows commands appropriately    LABS:                        13.8   6.28  )-----------( 122      ( 09 Mar 2021 09:53 )             42.7     CBC Full  -  ( 09 Mar 2021 09:53 )  WBC Count : 6.28 K/uL  Hemoglobin : 13.8 g/dL  Hematocrit : 42.7 %  Platelet Count - Automated : 122 K/uL  Mean Cell Volume : 91.8 fl  Mean Cell Hemoglobin : 29.7 pg  Mean Cell Hemoglobin Concentration : 32.3 gm/dL  Auto Neutrophil # : 5.34 K/uL  Auto Lymphocyte # : 0.60 K/uL  Auto Monocyte # : 0.32 K/uL  Auto Eosinophil # : 0.00 K/uL  Auto Basophil # : 0.01 K/uL  Auto Neutrophil % : 84.9 %  Auto Lymphocyte % : 9.6 %  Auto Monocyte % : 5.1 %  Auto Eosinophil % : 0.0 %  Auto Basophil % : 0.2 %    09 Mar 2021 09:53    141    |  105    |  22     ----------------------------<  134    4.0     |  29     |  1.20     Ca    9.1        09 Mar 2021 09:53  Mg     2.2       08 Mar 2021 15:39    TPro  6.8    /  Alb  3.4    /  TBili  1.3    /  DBili  x      /  AST  38     /  ALT  61     /  AlkPhos  106    09 Mar 2021 09:53    PT/INR - ( 08 Mar 2021 16:47 )   PT: 13.5 sec;   INR: 1.16 ratio         PTT - ( 08 Mar 2021 16:47 )  PTT:33.3 sec    CAPILLARY BLOOD GLUCOSE              RADIOLOGY & ADDITIONAL TESTS:    Personally reviewed.     Consultant(s) Notes Reviewed:  [x] YES  [ ] NO

## 2021-03-09 NOTE — PROVIDER CONTACT NOTE (OTHER) - ASSESSMENT
VSS, pt is on remote tele with cont pulse ox
Pt A&Ox4. Denies CP, SOB, palpitations, or any other symptoms. On 4LNC and saturating well

## 2021-03-09 NOTE — PROGRESS NOTE ADULT - SUBJECTIVE AND OBJECTIVE BOX
[facility] [place] [facility] Progress Note [phone] (Dr. Richard, Luna, Jorge, Beth)    CHIEF COMPLAINT: Patient is a 80y old  Male who presents with a chief complaint of Rapid AFib, Acute decompensated CHF exacerbation (08 Mar 2021 17:56)      Follow Up Today: The patient denies any chest discomfort or shortness of breath.    HPI:  79yo male with PMH of anxiety, HFpEF (EF 69% in 2015) and prostate cancer s/p radiation (2012) presents with a chief complaint of rapid heart rate. Wife states that two weeks ago, the patient was shoveling snow and felt discomfort in his chest and noted he was having difficulty catching his breath. He also developed a cough and wife began giving him Mucinex and cough syrup, which did not improve the cough. Today, she noticed that on their daily walk the patient was walking slowly and was having difficulty breathing. She decided to check his blood pressure and the machine was not able to read his blood pressure. She then checked his pulse ox and the pulse oximeter stated that his HR was 145 and his oxygen saturation was in the low 90s. She then called her cardiologist, Dr. Richard (who has not seen the patient in the past) and he recommend that the patient come to the ED. Patient still admits shortness of breath during examination and admits pleuritic chest pain. Patient feels like his shortness of breath has worsened since being in the ED and he also admits diaphoresis. He denies lower extremity edema, however admits abdominal distension. Patient does note orthopnea and sleeps with two pillows at night. He denies any dizziness, palpitations or syncope. Patient states he is supposed to get his second COVID-19 vaccine on 3/10 and does not want to miss his appointment.    In the ED:  Vitals: Temp 97F |  | /87 | RR 18 | SpO2 95% on RA  Labs: Platelets 115, Alk phos 123, CKMB 7.1, Trop 0.085, BNP 4078  CXR: Nonspecific bibasilar interstitial prominence and trace effusions as described. Recommend clinical correlation and follow-up  EKG: Afib w/ RVR rate 143, QTc 456  Received: Cardizem 10mg IVP x3, Lasix 40mg IVP, 1L NS bolus (08 Mar 2021 17:56)      PAST MEDICAL & SURGICAL HISTORY:  Anxiety    Tinnitus    Left hydrocele    Prostate cancer  Dx 2012 s/p Radiation    History of hydrocelectomy  2015    S/P excision of lipoma  right hip 20 years ago        MEDICATIONS  (STANDING):  ALBUTerol    90 MICROgram(s) HFA Inhaler 2 Puff(s) Inhalation every 6 hours  aspirin 325 milliGRAM(s) Oral daily  atorvastatin 40 milliGRAM(s) Oral at bedtime  enoxaparin Injectable 80 milliGRAM(s) SubCutaneous two times a day  metoprolol tartrate 50 milliGRAM(s) Oral three times a day  PARoxetine 10 milliGRAM(s) Oral at bedtime    MEDICATIONS  (PRN):  acetaminophen   Tablet .. 650 milliGRAM(s) Oral every 4 hours PRN Mild Pain (1 - 3)      Allergies    No Known Allergies    Intolerances        REVIEW OF SYSTEMS:    All other review of systems is negative unless indicated above    Vital Signs Last 24 Hrs  T(C): 36.8 (09 Mar 2021 04:55), Max: 36.8 (09 Mar 2021 04:55)  T(F): 98.2 (09 Mar 2021 04:55), Max: 98.2 (09 Mar 2021 04:55)  HR: 112 (09 Mar 2021 04:55) (94 - 144)  BP: 101/74 (09 Mar 2021 04:55) (101/74 - 127/82)  BP(mean): --  RR: 16 (09 Mar 2021 04:55) (16 - 18)  SpO2: 93% (09 Mar 2021 04:55) (93% - 96%)    I&O's Summary    08 Mar 2021 07:01  -  09 Mar 2021 06:13  --------------------------------------------------------  IN: 300 mL / OUT: 0 mL / NET: 300 mL        PHYSICAL EXAM:    Constitutional: NAD, awake and alert, well-developed  Eyes:  EOMI,  Pupils round, No oral cyanosis.  HEENT: No exudate or erythema  Pulmonary: Non-labored, breath sounds are clear bilaterally, No wheezing, rales or rhonchi  Cardiovascular: Regular, S1 and S2, No murmurs, rubs, gallops oir clicks  Gastrointestinal: Bowel Sounds present, soft, nontender.   Ext: No significant LE edema with good pulses x 4  Neurological: Alert, no gross focal motor deficits  Skin: No rashes.  Psych:  Mood & affect appropriate    LABS: All Labs Reviewed:                        13.6   5.62  )-----------( 115      ( 08 Mar 2021 15:39 )             40.6     08 Mar 2021 15:39    140    |  106    |  19     ----------------------------<  107    4.3     |  30     |  1.30     Ca    9.4        08 Mar 2021 15:39  Mg     2.2       08 Mar 2021 15:39    TPro  6.9    /  Alb  3.5    /  TBili  0.9    /  DBili  x      /  AST  50     /  ALT  66     /  AlkPhos  123    08 Mar 2021 15:39    PT/INR - ( 08 Mar 2021 16:47 )   PT: 13.5 sec;   INR: 1.16 ratio         PTT - ( 08 Mar 2021 16:47 )  PTT:33.3 sec  CARDIAC MARKERS ( 08 Mar 2021 20:27 )  .085 ng/mL / x     / 288 U/L / x     / 6.7 ng/mL  CARDIAC MARKERS ( 08 Mar 2021 15:39 )  .085 ng/mL / x     / x     / x     / 7.1 ng/mL      Blood Culture:   03-08 @ 15:39  Pro Bnp 4078    03-08 @ 15:39  TSH: 1.61      RADIOLOGY/EKG:    Attending Attestation:   20 minutes spent on total encounter; more than 50% of the visit was spent counseling and/or coordinating care by the attending physician.     ASSESSMENT AND PLAN [facility] Progress Note [phone] (Dr. Richard, Luna, Jorge, Bteh)    CHIEF COMPLAINT: Patient is a 80y old  Male who presents with a chief complaint of Rapid AFib, Acute decompensated CHF exacerbation (08 Mar 2021 17:56)      Follow Up Today: The patient denies any chest discomfort and feels breathing is a "little better". No events overnight    HPI:  81yo male with PMH of anxiety, HFpEF (EF 69% in 2015) and prostate cancer s/p radiation (2012) presents with a chief complaint of rapid heart rate. Wife states that two weeks ago, the patient was shoveling snow and felt discomfort in his chest and noted he was having difficulty catching his breath. He also developed a cough and wife began giving him Mucinex and cough syrup, which did not improve the cough. Today, she noticed that on their daily walk the patient was walking slowly and was having difficulty breathing. She decided to check his blood pressure and the machine was not able to read his blood pressure. She then checked his pulse ox and the pulse oximeter stated that his HR was 145 and his oxygen saturation was in the low 90s. She then called her cardiologist, Dr. Richard (who has not seen the patient in the past) and he recommend that the patient come to the ED. Patient still admits shortness of breath during examination and admits pleuritic chest pain. Patient feels like his shortness of breath has worsened since being in the ED and he also admits diaphoresis. He denies lower extremity edema, however admits abdominal distension. Patient does note orthopnea and sleeps with two pillows at night. He denies any dizziness, palpitations or syncope. Patient states he is supposed to get his second COVID-19 vaccine on 3/10 and does not want to miss his appointment.    In the ED:  Vitals: Temp 97F |  | /87 | RR 18 | SpO2 95% on RA  Labs: Platelets 115, Alk phos 123, CKMB 7.1, Trop 0.085, BNP 4078  CXR: Nonspecific bibasilar interstitial prominence and trace effusions as described. Recommend clinical correlation and follow-up  EKG: Afib w/ RVR rate 143, QTc 456  Received: Cardizem 10mg IVP x3, Lasix 40mg IVP, 1L NS bolus (08 Mar 2021 17:56)      PAST MEDICAL & SURGICAL HISTORY:  Anxiety    Tinnitus    Left hydrocele    Prostate cancer  Dx 2012 s/p Radiation    History of hydrocelectomy  2015    S/P excision of lipoma  right hip 20 years ago        MEDICATIONS  (STANDING):  ALBUTerol    90 MICROgram(s) HFA Inhaler 2 Puff(s) Inhalation every 6 hours  aspirin 325 milliGRAM(s) Oral daily  atorvastatin 40 milliGRAM(s) Oral at bedtime  enoxaparin Injectable 80 milliGRAM(s) SubCutaneous two times a day  metoprolol tartrate 50 milliGRAM(s) Oral three times a day  PARoxetine 10 milliGRAM(s) Oral at bedtime    MEDICATIONS  (PRN):  acetaminophen   Tablet .. 650 milliGRAM(s) Oral every 4 hours PRN Mild Pain (1 - 3)      Allergies    No Known Allergies    Intolerances        REVIEW OF SYSTEMS:    All other review of systems is negative unless indicated above    Vital Signs Last 24 Hrs  T(C): 36.8 (09 Mar 2021 04:55), Max: 36.8 (09 Mar 2021 04:55)  T(F): 98.2 (09 Mar 2021 04:55), Max: 98.2 (09 Mar 2021 04:55)  HR: 112 (09 Mar 2021 04:55) (94 - 144)  BP: 101/74 (09 Mar 2021 04:55) (101/74 - 127/82)  BP(mean): --  RR: 16 (09 Mar 2021 04:55) (16 - 18)  SpO2: 93% (09 Mar 2021 04:55) (93% - 96%)    I&O's Summary    08 Mar 2021 07:01  -  09 Mar 2021 06:13  --------------------------------------------------------  IN: 300 mL / OUT: 0 mL / NET: 300 mL        PHYSICAL EXAM:    Constitutional: NAD, awake and alert, well-developed  Eyes:  EOMI,  Pupils round, No oral cyanosis.  HEENT: No exudate or erythema  Pulmonary: Non-labored, breath sounds are clear bilaterally, No wheezing, rales or rhonchi  Cardiovascular: Regular, S1 and S2, No murmurs, rubs, gallops oir clicks  Gastrointestinal: Bowel Sounds present, soft, nontender.   Ext: No significant LE edema with good pulses x 4  Neurological: Alert, no gross focal motor deficits  Skin: No rashes.  Psych:  Mood & affect appropriate    LABS: All Labs Reviewed:                        13.6   5.62  )-----------( 115      ( 08 Mar 2021 15:39 )             40.6     08 Mar 2021 15:39    140    |  106    |  19     ----------------------------<  107    4.3     |  30     |  1.30     Ca    9.4        08 Mar 2021 15:39  Mg     2.2       08 Mar 2021 15:39    TPro  6.9    /  Alb  3.5    /  TBili  0.9    /  DBili  x      /  AST  50     /  ALT  66     /  AlkPhos  123    08 Mar 2021 15:39    PT/INR - ( 08 Mar 2021 16:47 )   PT: 13.5 sec;   INR: 1.16 ratio         PTT - ( 08 Mar 2021 16:47 )  PTT:33.3 sec  CARDIAC MARKERS ( 08 Mar 2021 20:27 )  .085 ng/mL / x     / 288 U/L / x     / 6.7 ng/mL  CARDIAC MARKERS ( 08 Mar 2021 15:39 )  .085 ng/mL / x     / x     / x     / 7.1 ng/mL      Blood Culture:   03-08 @ 15:39  Pro Bnp 4078    03-08 @ 15:39  TSH: 1.61      RADIOLOGY/EKG:    Attending Attestation:   20 minutes spent on total encounter; more than 50% of the visit was spent counseling and/or coordinating care by the attending physician.     ASSESSMENT AND PLAN

## 2021-03-09 NOTE — PROGRESS NOTE ADULT - ASSESSMENT
79yo male with PMH of anxiety, HFpEF (EF 69% in 2015, diastolic dysfunction) and prostate cancer s/p radiation (2012) presents with a chief complaint of rapid heart rate admitted for new onset AFib with RVR and acute on chronic diastolic congestive heart failure.

## 2021-03-09 NOTE — CHART NOTE - NSCHARTNOTEFT_GEN_A_CORE
Called by RN for patient feeling dizzy after standing. Patient admitted with rapid afib. Patient on lopressor 50mg TID po, lasix 40mg IVP qd, paxil 10mg po qhs.   - Will get repeat vitals and orthstatics Called by RN for patient feeling dizzy after standing. Patient admitted with rapid afib. Patient on lopressor 50mg TID po, lasix 40mg IVP qd, paxil 10mg po qhs.   Tele: No events through out the day. Patient is currently sinus tachycardic ranging 112s however not sustaining.   - Will get repeat vitals and orthostatics Called by RN for patient feeling dizzy after standing. Patient admitted with rapid afib. Patient on lopressor 50mg TID po, lasix 40mg IVP qd, paxil 10mg po qhs.   Tele: No events through out the day. Patient is currently sinus tachycardic ranging 112s however not sustaining.   - Will get repeat vitals and orthostatics      Addendum 00:00  T(C): 36.2 (09 Mar 2021 21:01), Max: 36.8 (09 Mar 2021 04:55)  T(F): 97.2 (09 Mar 2021 21:01), Max: 98.2 (09 Mar 2021 04:55)  HR: 107 (09 Mar 2021 21:01) (105 - 117)  BP: 105/82 (09 Mar 2021 21:01) (94/67 - 110/70)  RR: 16 (09 Mar 2021 21:01) (16 - 16)  SpO2: 94% (09 Mar 2021 21:01) (91% - 94%)    Orthostatics negative.   Patient only feels nauseous while walking around. No need for zofran at this time as patient is going to sleep. As per RN, patient hasn't been eating during the day 2/2 nausea.   Would consider zofran in daytime if patient remains nauseous.

## 2021-03-09 NOTE — CONSULT NOTE ADULT - SUBJECTIVE AND OBJECTIVE BOX
[facility] Cardiology Consult - Luna Richard Beth Patel [phone]    CHIEF COMPLAINT: Patient is a 80y old  Male who presents with a chief complaint of rapid heart rate and shortness of breath while taking a walk today.  His wife is at bedside and states that she used the pulse ox to take his heart rate which was 145bpm while he was standing still from the walk.  He states that he is getting dyspnea on exertion.  He states that at night and laying down is the worst and gets better if he sits up.  He denies feeling his heart racing.  He states that his symptoms began 2 - 3 weeks ago while he was shoveling the snow on his driveway and on the last shoveling of snow under the car, he threw it over his shoulder and felt significant chest discomfort, dyspnea at rest.  He states that these symptoms persisted for days after and the chest discomfort got better.  He states that the dyspnea on exertion has not improved.  He denies any family hx of CAD.  He denies any prior medical hx of HTN, HLD , CVA, heart attack.  He was active all of his life.        PAST MEDICAL & SURGICAL HISTORY:  Tinnitus    Left hydrocele    Prostate cancer  Dx 2012 s/p Radiation    S/P excision of lipoma  right hip 20 years ago        SOCIAL HISTORY: Alochol: Denied  Smoking: Nonsmoker  Drug Use: Denied  Marital Status:         FAMILY HISTORY: FAMILY HISTORY:  Family history of diabetes mellitus (Father)    Family history of Alzheimer&#x27;s disease (Mother)        MEDICATIONS  (STANDING):    MEDICATIONS  (PRN):      Allergies    No Known Allergies    Intolerances        REVIEW OF SYSTEMS:  CONSTITUTIONAL: No weakness, fevers or chills  EYES/ENT: No visual changes;  No vertigo or throat pain   NECK: No pain or stiffness  RESPIRATORY: No cough, wheezing, hemoptysis; Positive shortness of breath  CARDIOVASCULAR: No chest pain or palpitations  GASTROINTESTINAL: No abdominal pain. No nausea, vomiting, or hematemesis; No diarrhea or constipation. No melena or hematochezia.  GENITOURINARY: No dysuria, frequency or hematuria  NEUROLOGICAL: No numbness or weakness  SKIN: No itching or rash  All other review of systems is negative unless indicated above    VITAL SIGNS:   Vital Signs Last 24 Hrs  T(C): 36.1 (08 Mar 2021 14:46), Max: 36.1 (08 Mar 2021 14:46)  T(F): 97 (08 Mar 2021 14:46), Max: 97 (08 Mar 2021 14:46)  HR: 94 (08 Mar 2021 16:15) (94 - 144)  BP: 103/80 (08 Mar 2021 16:15) (103/80 - 127/82)  BP(mean): --  RR: 16 (08 Mar 2021 16:15) (16 - 18)  SpO2: 94% (08 Mar 2021 16:15) (94% - 95%)    I&O's Summary      PHYSICAL EXAM:  Constitutional: Awake in NAD  HEENT:  CH[place], EOMI  Neck: No JVD  Pulmonary: CTA B/L No R/R/W  Cardiovascular: PMI not palpable non-displaced Irregularly irregular S1 and S2, no murmurs  Gastrointestinal: Bowel Sounds present, soft, nontender.   Extremities: Trace peripheral edema.   Neuro: No gross focal deficits    LABS: All Labs Reviewed:                        13.6   5.62  )-----------( 115      ( 08 Mar 2021 15:39 )             40.6     08 Mar 2021 15:39    140    |  106    |  19     ----------------------------<  107    4.3     |  30     |  1.30     Ca    9.4        08 Mar 2021 15:39  Mg     2.2       08 Mar 2021 15:39    TPro  6.9    /  Alb  3.5    /  TBili  0.9    /  DBili  x      /  AST  50     /  ALT  66     /  AlkPhos  123    08 Mar 2021 15:39      CARDIAC MARKERS ( 08 Mar 2021 15:39 )  .085 ng/mL / x     / x     / x     / 7.1 ng/mL      Blood Culture:   03-08 @ 15:39  Pro Bnp 4078    03-08 @ 15:39  TSH: 1.61      RADIOLOGY/EKG:    < from: Xray Chest 1 View- PORTABLE-Urgent (Xray Chest 1 View- PORTABLE-Urgent .) (03.08.21 @ 15:49) >    AP chest.    Heart magnified by projection. Bibasilar interstitial prominence may all represent interstitial fibrotic changes. Difficult to exclude an element of congestion and/or superimposed acute infection. Recommend clinical correlation close follow-up. Trace basilar effusions.    Impression: Nonspecific bibasilar interstitial prominence and trace effusions as described. Recommend clinical correlation and follow-up    < end of copied text >    ECG is Afib at 144bpm with no acute ST T Changes
All records reviewed.    HPI:  81y/o man w hx anxiety, HFpEF (EF 69% in 2015) and prostate cancer s/p radiation (2012), adm w symptomatic A fib, HR noted to be 145 at home  Seen by Cardiology, started on Lovenox    In the ED:  Vitals: Temp 97F |  | /87 | RR 18 | SpO2 95% on RA  Labs: Platelets 115, Alk phos 123, CKMB 7.1, Trop 0.085, BNP 4078  CXR: Nonspecific bibasilar interstitial prominence and trace effusions as described. 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 Pt did not return to office since 2019  Denies incr or easy bleed/bruisse.      PAST MEDICAL & SURGICAL HISTORY:  Anxiety    Tinnitus    Left hydrocele    Prostate cancer  Dx 2012 s/p Radiation    History of hydrocelectomy  2015    S/P excision of lipoma  right hip 20 years ago        Review of System:  see above, at present no palpitations, nausea, vomit, diarrhea, constipation, dizziness, fatigue  has 2 pillow orthopnea    MEDICATIONS  (STANDING):  ALBUTerol    90 MICROgram(s) HFA Inhaler 2 Puff(s) Inhalation every 6 hours  aspirin 325 milliGRAM(s) Oral daily  atorvastatin 40 milliGRAM(s) Oral at bedtime  enoxaparin Injectable 80 milliGRAM(s) SubCutaneous two times a day  metoprolol tartrate 50 milliGRAM(s) Oral three times a day  PARoxetine 10 milliGRAM(s) Oral at bedtime    MEDICATIONS  (PRN):  acetaminophen   Tablet .. 650 milliGRAM(s) Oral every 4 hours PRN Mild Pain (1 - 3)      Allergies    No Known Allergies    Intolerances        SOCIAL HISTORY:  denies    FAMILY HISTORY:  Family history of diabetes mellitus (Father)    Family history of Alzheimer&#x27;s disease (Mother)    No blood dyscrasia        Vital Signs Last 24 Hrs  T(C): 36.8 (09 Mar 2021 04:55), Max: 36.8 (09 Mar 2021 04:55)  T(F): 98.2 (09 Mar 2021 04:55), Max: 98.2 (09 Mar 2021 04:55)  HR: 112 (09 Mar 2021 04:55) (94 - 144)  BP: 101/74 (09 Mar 2021 04:55) (101/74 - 127/82)  BP(mean): --  RR: 16 (09 Mar 2021 04:55) (16 - 18)  SpO2: 93% (09 Mar 2021 04:55) (93% - 96%)    PHYSICAL EXAM:      General:well developed well nourished, in no acute distress  Eyes:sclera anicteric, pupils equal and EOMI  Neck:supple, trachea midline  Lungs:clear, no wheeze/rhonchi  Cardiovascular:regular rate S1 S2  Abdomen:soft, nontender, no organomegaly present, bowel sounds normal  Neurological:alert and oriented x3, Cranial Nerves II-XII grossly intact  Skin:no increased ecchymosis/petechiae/purpura  Lymph Nodes:no palpable cervical/supraclavicular lymph nodes enlargements  Extremities:no cyanosis/clubbing/edema        LABS:                        13.8   6.28  )-----------( 122      ( 09 Mar 2021 09:53 )             42.7     03-09 @ 09:53  WBC6.28  RBC4.65 Hgb13.8 Hct42.7  MCV91.8  Bwvp296  Auto Jqdvbw69.9 Band-- Auto Lymph9.6 Atypical Lymph-- Reactive Lymph-- Auto Mono5.1 Auto Eos0.0 Auto Baso0.2        03-08 @ 15:39  WBC5.62  RBC4.50 Hgb13.6 Hct40.6  MCV90.2  Pvmm531  Auto Lcqklm06.9 Band-- Auto Lymph15.5 Atypical Lymph-- Reactive Lymph-- Auto Mono8.0 Auto Eos0.7 Auto Baso0.5          03-09    141  |  105  |  22  ----------------------------<  134<H>  4.0   |  29  |  1.20    Ca    9.1      09 Mar 2021 09:53  Mg     2.2     03-08    TPro  6.8  /  Alb  3.4  /  TBili  1.3<H>  /  DBili  x   /  AST  38<H>  /  ALT  61  /  AlkPhos  106  03-09 03-08 @ 16:47  PT13.5 INR1.16  PTT33.3        PERIPHERAL BLOOD SMEAR REVIEW:        RADIOLOGY & ADDITIONAL STUDIES:  < from: CT Angio Chest w/ IV Cont (03.08.21 @ 22:02) >    EXAM:  CT ANGIO CHEST (W)AW IC                            PROCEDURE DATE:  03/08/2021          INTERPRETATION:  PROCEDURE INFORMATION:  Exam: CT Angiography Chest Without And With Contrast  Exam date and time: 3/8/2021 9:55 PM  Age: 80 years old  Clinical indication: Chest pain; Type not specified    TECHNIQUE:  Imaging protocol: Computed tomographic angiography of the chest without and  with contrast.  3D rendering (Not supervised by radiologist): MIP and/or 3D reconstructed  images were created by the technologist.  Contrast material: OMINPAQUE; Contrast volume: 90 ml; Contrast route: IV;    COMPARISON:  DX XR CHEST URGENT 3/8/2021 3:26 PM    FINDINGS:  Pulmonary arteries: No CT evidence of acute pulmonary embolism.  Aorta: No CT evidenceof thoracic aortic aneurysm or acute intramural thoracic  aortic hematoma.    Lungs: Bibasilar compressive atelectasis. Interstitial edema  Pleural spaces: Moderate bilateral pleural effusions are present with compressive  atelectases seen in both lower lobes.  Heart: Mild cardiomegaly  Lymph nodes: Unremarkable. No enlarged lymph nodes.    Bones/joints: Degenerative changes  Soft tissues: Bilateral gynecomastia    IMPRESSION:     No CT evidence of acute pulmonary embolism.  Interstitial edema  Moderate bilateral pleural effusions are present with compressive atelectases  seen in both lower lobes.      < end of copied text >  
Date/Time Patient Seen:  		  Referring MD:   Data Reviewed	       Patient is a 80y old  Male who presents with a chief complaint of Rapid AFib, Acute decompensated CHF exacerbation (09 Mar 2021 06:13)      Subjective/HPI  in bed  seen and examined  vs and meds reviewed  labs reviewed  h and p reviewed  er provider note reviewed  on o2 support     81yo male with PMH of anxiety, HFpEF (EF 69% in 2015) and prostate cancer s/p radiation (2012) presents with a chief complaint of rapid heart rate. Wife states that two weeks ago, the patient was shoveling snow and felt discomfort in his chest and noted he was having difficulty catching his breath. He also developed a cough and wife began giving him Mucinex and cough syrup, which did not improve the cough. Today, she noticed that on their daily walk the patient was walking slowly and was having difficulty breathing. She decided to check his blood pressure and the machine was not able to read his blood pressure. She then checked his pulse ox and the pulse oximeter stated that his HR was 145 and his oxygen saturation was in the low 90s. She then called her cardiologist, Dr. Richard (who has not seen the patient in the past) and he recommend that the patient come to the ED. Patient still admits shortness of breath during examination and admits pleuritic chest pain. Patient feels like his shortness of breath has worsened since being in the ED and he also admits diaphoresis. He denies lower extremity edema, however admits abdominal distension. Patient does note orthopnea and sleeps with two pillows at night. He denies any dizziness, palpitations or syncope. Patient states he is supposed to get his second COVID-19 vaccine on 3/10 and does not want to miss his appointment.      PAST MEDICAL & SURGICAL HISTORY:  Anxiety    Tinnitus    Left hydrocele    Prostate cancer  Dx 2012 s/p Radiation    History of hydrocelectomy  2015    S/P excision of lipoma  right hip 20 years ago          Medication list         MEDICATIONS  (STANDING):  ALBUTerol    90 MICROgram(s) HFA Inhaler 2 Puff(s) Inhalation every 6 hours  aspirin 325 milliGRAM(s) Oral daily  atorvastatin 40 milliGRAM(s) Oral at bedtime  enoxaparin Injectable 80 milliGRAM(s) SubCutaneous two times a day  metoprolol tartrate 50 milliGRAM(s) Oral three times a day  PARoxetine 10 milliGRAM(s) Oral at bedtime    MEDICATIONS  (PRN):  acetaminophen   Tablet .. 650 milliGRAM(s) Oral every 4 hours PRN Mild Pain (1 - 3)         Vitals log        ICU Vital Signs Last 24 Hrs  T(C): 36.8 (09 Mar 2021 04:55), Max: 36.8 (09 Mar 2021 04:55)  T(F): 98.2 (09 Mar 2021 04:55), Max: 98.2 (09 Mar 2021 04:55)  HR: 112 (09 Mar 2021 04:55) (94 - 144)  BP: 101/74 (09 Mar 2021 04:55) (101/74 - 127/82)  BP(mean): --  ABP: --  ABP(mean): --  RR: 16 (09 Mar 2021 04:55) (16 - 18)  SpO2: 93% (09 Mar 2021 04:55) (93% - 96%)           Input and Output:  I&O's Detail    08 Mar 2021 07:01  -  09 Mar 2021 06:30  --------------------------------------------------------  IN:    Oral Fluid: 300 mL  Total IN: 300 mL    OUT:  Total OUT: 0 mL    Total NET: 300 mL          Lab Data                        13.6   5.62  )-----------( 115      ( 08 Mar 2021 15:39 )             40.6     03-08    140  |  106  |  19  ----------------------------<  107<H>  4.3   |  30  |  1.30    Ca    9.4      08 Mar 2021 15:39  Mg     2.2     03-08    TPro  6.9  /  Alb  3.5  /  TBili  0.9  /  DBili  x   /  AST  50<H>  /  ALT  66  /  AlkPhos  123<H>  03-08      CARDIAC MARKERS ( 08 Mar 2021 20:27 )  .085 ng/mL / x     / 288 U/L / x     / 6.7 ng/mL  CARDIAC MARKERS ( 08 Mar 2021 15:39 )  .085 ng/mL / x     / x     / x     / 7.1 ng/mL        Review of Systems	  sob  rodriguez    Objective     Physical Examination  heart s1s2  lung dec BS  abd soft        Pertinent Lab findings & Imaging      Tara:  NO   Adequate UO     I&O's Detail    08 Mar 2021 07:01  -  09 Mar 2021 06:30  --------------------------------------------------------  IN:    Oral Fluid: 300 mL  Total IN: 300 mL    OUT:  Total OUT: 0 mL    Total NET: 300 mL               Discussed with:     Cultures:	        Radiology        Heart magnified by projection. Bibasilar interstitial prominence may all represent interstitial fibrotic changes. Difficult to exclude an element of congestion and/or superimposed acute infection. Recommend clinical correlation close follow-up. Trace basilar effusions.    Impression: Nonspecific bibasilar interstitial prominence and trace effusions as described. Recommend clinical correlation and follow-up

## 2021-03-09 NOTE — CONSULT NOTE ADULT - ASSESSMENT
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 Pt did not return to office since 2019  Denies incr or easy bleed/bruise.    -isolated mild thrombocytopenia, asx and adequate for appropriate hemostasis at thjs level. Present platelets similar to known baseline. No absolute contraindication for anticoagulation from heme standpoint, Decision on anticoagulation and type as per Cardiologh as this is for A fib.  -no acute heme intervention needed for the thrombocytopenia.    discussed w pt  thank you, please call if any questions

## 2021-03-09 NOTE — CONSULT NOTE ADULT - PROBLEM SELECTOR RECOMMENDATION 9
79yo male with PMH of anxiety, HFpEF (EF 69% in 2015, diastolic dysfunction) and prostate cancer s/p radiation (2012) presents with a chief complaint of rapid heart rate admitted for new onset AFib with RVR and acute on chronic diastolic  congestive heart failure exacerbation, r/o PE  curr on BID Lovenox - AF -   CHF eval - known HFpEF -   Diuresis - cvs rx regimen optimization as per Cardio Recs  I and O  monitor VS and HD and Sat  replete lytes  TTE planned  Cardio eval noted - in progress -   wean o2 support as tolerated - keep sat > 90 pct  CXR - c/w Pulm edema

## 2021-03-10 DIAGNOSIS — R79.89 OTHER SPECIFIED ABNORMAL FINDINGS OF BLOOD CHEMISTRY: ICD-10-CM

## 2021-03-10 LAB
ANION GAP SERPL CALC-SCNC: 9 MMOL/L — SIGNIFICANT CHANGE UP (ref 5–17)
BUN SERPL-MCNC: 36 MG/DL — HIGH (ref 7–23)
CALCIUM SERPL-MCNC: 8.7 MG/DL — SIGNIFICANT CHANGE UP (ref 8.5–10.1)
CHLORIDE SERPL-SCNC: 101 MMOL/L — SIGNIFICANT CHANGE UP (ref 96–108)
CO2 SERPL-SCNC: 27 MMOL/L — SIGNIFICANT CHANGE UP (ref 22–31)
CREAT SERPL-MCNC: 1.5 MG/DL — HIGH (ref 0.5–1.3)
GLUCOSE SERPL-MCNC: 165 MG/DL — HIGH (ref 70–99)
HCT VFR BLD CALC: 40.6 % — SIGNIFICANT CHANGE UP (ref 39–50)
HGB BLD-MCNC: 13.5 G/DL — SIGNIFICANT CHANGE UP (ref 13–17)
MAGNESIUM SERPL-MCNC: 2.2 MG/DL — SIGNIFICANT CHANGE UP (ref 1.6–2.6)
MCHC RBC-ENTMCNC: 30.1 PG — SIGNIFICANT CHANGE UP (ref 27–34)
MCHC RBC-ENTMCNC: 33.3 GM/DL — SIGNIFICANT CHANGE UP (ref 32–36)
MCV RBC AUTO: 90.6 FL — SIGNIFICANT CHANGE UP (ref 80–100)
NRBC # BLD: 0 /100 WBCS — SIGNIFICANT CHANGE UP (ref 0–0)
PHOSPHATE SERPL-MCNC: 4.9 MG/DL — HIGH (ref 2.5–4.5)
PLATELET # BLD AUTO: 123 K/UL — LOW (ref 150–400)
POTASSIUM SERPL-MCNC: 3.6 MMOL/L — SIGNIFICANT CHANGE UP (ref 3.5–5.3)
POTASSIUM SERPL-SCNC: 3.6 MMOL/L — SIGNIFICANT CHANGE UP (ref 3.5–5.3)
RBC # BLD: 4.48 M/UL — SIGNIFICANT CHANGE UP (ref 4.2–5.8)
RBC # FLD: 14.7 % — HIGH (ref 10.3–14.5)
SODIUM SERPL-SCNC: 137 MMOL/L — SIGNIFICANT CHANGE UP (ref 135–145)
TSH SERPL-MCNC: 2.96 UIU/ML — SIGNIFICANT CHANGE UP (ref 0.36–3.74)
WBC # BLD: 7.37 K/UL — SIGNIFICANT CHANGE UP (ref 3.8–10.5)
WBC # FLD AUTO: 7.37 K/UL — SIGNIFICANT CHANGE UP (ref 3.8–10.5)

## 2021-03-10 PROCEDURE — 99233 SBSQ HOSP IP/OBS HIGH 50: CPT

## 2021-03-10 PROCEDURE — 71045 X-RAY EXAM CHEST 1 VIEW: CPT | Mod: 26

## 2021-03-10 RX ORDER — DIGOXIN 250 MCG
125 TABLET ORAL EVERY 12 HOURS
Refills: 0 | Status: DISCONTINUED | OUTPATIENT
Start: 2021-03-10 | End: 2021-03-11

## 2021-03-10 RX ORDER — APIXABAN 2.5 MG/1
5 TABLET, FILM COATED ORAL
Refills: 0 | Status: DISCONTINUED | OUTPATIENT
Start: 2021-03-10 | End: 2021-03-11

## 2021-03-10 RX ORDER — APIXABAN 2.5 MG/1
1 TABLET, FILM COATED ORAL
Qty: 60 | Refills: 0
Start: 2021-03-10 | End: 2021-04-08

## 2021-03-10 RX ORDER — ASPIRIN/CALCIUM CARB/MAGNESIUM 324 MG
81 TABLET ORAL DAILY
Refills: 0 | Status: DISCONTINUED | OUTPATIENT
Start: 2021-03-10 | End: 2021-03-11

## 2021-03-10 RX ADMIN — ENOXAPARIN SODIUM 80 MILLIGRAM(S): 100 INJECTION SUBCUTANEOUS at 05:46

## 2021-03-10 RX ADMIN — Medication 50 MILLIGRAM(S): at 22:45

## 2021-03-10 RX ADMIN — Medication 81 MILLIGRAM(S): at 13:54

## 2021-03-10 RX ADMIN — Medication 50 MILLIGRAM(S): at 13:54

## 2021-03-10 RX ADMIN — Medication 50 MILLIGRAM(S): at 05:46

## 2021-03-10 RX ADMIN — APIXABAN 5 MILLIGRAM(S): 2.5 TABLET, FILM COATED ORAL at 18:34

## 2021-03-10 RX ADMIN — Medication 10 MILLIGRAM(S): at 22:45

## 2021-03-10 RX ADMIN — ATORVASTATIN CALCIUM 40 MILLIGRAM(S): 80 TABLET, FILM COATED ORAL at 22:45

## 2021-03-10 RX ADMIN — Medication 125 MICROGRAM(S): at 18:34

## 2021-03-10 RX ADMIN — Medication 40 MILLIGRAM(S): at 09:56

## 2021-03-10 RX ADMIN — ALBUTEROL 2 PUFF(S): 90 AEROSOL, METERED ORAL at 13:55

## 2021-03-10 NOTE — PROVIDER CONTACT NOTE (OTHER) - REASON
Nurse is unable to run dig loading dose on medsurg/tele floor.
A fib rate 130's
Pt c/o dizziness/nausea when ambulating

## 2021-03-10 NOTE — PROGRESS NOTE ADULT - NSHPATTENDINGPLANDISCUSS_GEN_ALL_CORE
patient re: diuresis, monitoring oxygen, heart rate control, anticipated hospital course
patient, cardio re: plan for diuresis, monitoring heart rate, medication titration, oxygen support, anticipated hospital course

## 2021-03-10 NOTE — PROGRESS NOTE ADULT - PROBLEM SELECTOR PLAN 3
Patient presenting with SYLVESTER x2 weeks and new pleuritic chest pain, sec to chf/afib/pleural effusion, PE ruled out.  -Continue supplemental oxygen as needed to maintain adequate saturation.  - Pulmonology (Dr Olea) following.
Due to diuresis as patient got 2 doses of Lasix yesterday.  Will give only one dose today and plan to transition to po Lasix tomorrow.  Monitor renal indices closely, especially given new treatment with Digoxin.

## 2021-03-10 NOTE — PROGRESS NOTE ADULT - ASSESSMENT
80M with h/o ITP and prostate CA who presents with new onset AF with RVR, positive trop, and acute CHF by CXR/symptoms.    ECHO - Very limited study,  shows normal LVEF - moderate to severe MR/TR       Suggest:    1.  c/w IV lasix 40mg daily follow up CXR today  2. Get TTE  3. Will need outpatient stress test  4. Aspirin 325mg daily, first dose now, Start Lipitor 40mg daily  5. Increase Metoprolol 50 q8  6. Continue with Lovenox full dose twice daily.   7. Will follow     80M with h/o ITP and prostate CA who presents with new onset AF with RVR, positive trop, and acute CHF by CXR/symptoms. Clinically improving with diuretics.    ECHO - Very limited study,  shows normal LVEF - moderate to severe MR/TR       Suggest:    1.  c/w IV lasix 40mg daily follow up CXR today  2.   3. Will need outpatient stress test  4. Aspirin 325mg daily, first dose now, Start Lipitor 40mg daily  5. Increase Metoprolol 50 q8  6. Change to Eliquis 5mg twice daily  7. 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 Clinically improving with diuretics. Still with fast heart rates.     ECHO - Very limited study,  shows normal LVEF - moderate to severe MR/TR       Suggest:    1.  c/w IV lasix 40mg daily follow up CXR today  2. Change Lovenox to Eliquis  3. Will need outpatient stress test  4. Change aspirin 81mg daily, C/w Lipitor 40mg daily  5. Continue w/ Metoprolol 50 q8 along with adding Digoxin for rate control.  6. Will follow

## 2021-03-10 NOTE — PROVIDER CONTACT NOTE (OTHER) - ACTION/TREATMENT ORDERED:
Metoprolol 25 mg po x 1
Provider states that she will come and start dose as ordered.
Will get vitals and MD states she is ordering orthostatics BPs to be obtained. Will continue safety and monitoring.

## 2021-03-10 NOTE — PROGRESS NOTE ADULT - PROBLEM SELECTOR PROBLEM 6
[facility]    [address]    [place] [address]    Phone:  [phone]    Fax:  [phone]       Thank You for choosing us for your health care visit. We are glad to serve you and happy to provide you with this summary of your visit. Please help us to ensure we have accurate records. If you find anything that needs to be changed, please let our staff know as soon as possible.          Your Demographic Information     Patient Name Sex Chaparro Fisher Male 1954       Ethnic Group Patient Race    Not of  or  Origin White      Your Visit Details     Date & Time Provider Department    6/15/2017 1:00 PM GREGORY [facility]      Your Upcoming Appointment*(Max 10)      12:20 PM CDT   Consultation with Vasquez Mercado MD   [facility] Pulmonology ([facility])    [address] [place] [address] [place] [address]   [phone]              Your To Do List     Follow-Up    Return in about 1 year (around 6/15/2018) for depression, hyperlipidemia.      We Ordered or Performed the Following     PREAUTHORIZATION MAY HAVE BILLING CONSENT       Conditions Discussed Today or Order-Related Diagnoses        Comments    Routine general medical examination at a health care facility    -  Primary     Special screening for malignant neoplasms, colon         Screening for malignant neoplasm of the rectum           Your Vitals Were     BP Pulse Height Weight BMI Smoking Status    128/78 76 5' 11.25\" (1.81 m) 275 lb 6.4 oz (124.9 kg) 38.14 kg/m2 Former Smoker      Medications Prescribed or Re-Ordered Today     pravastatin (PRAVACHOL) 20 MG tablet    Sig - Route: Take 1 tablet by mouth daily. - Oral    Class: Eprescribe    Pharmacy: [facility] [address] [place] [address] [address] [place] [facility] [place] Ph #: [phone]    Notes to Pharmacy: Please 
Anxiety
put on file      Your Current Medications Are        Disp Refills Start End    pravastatin (PRAVACHOL) 20 MG tablet 90 tablet 3 6/15/2017     Sig - Route: Take 1 tablet by mouth daily. - Oral    Class: Eprescribe    Notes to Pharmacy: Please put on file    fluoxetine (PROZAC) 20 MG tablet 45 tablet 0 6/6/2017     Sig - Route: Take 1.5 tablets by mouth daily. - Oral    Class: Eprescribe    Notes to Pharmacy: Dose increase 6/6/2017.    diclofenac (VOLTAREN) 75 MG EC tablet 42 tablet 1 1/24/2017     Sig - Route: Take 1 tablet by mouth 2 times daily. - Oral    Class: Eprescribe    aspirin 81 MG tablet 1 tablet 0 9/30/2015     Sig - Route: Take 1 tablet by mouth daily. - Oral    Class: Script Not Printed    Elastic Bandages & Supports (MEDICAL COMPRESSION STOCKINGS) Misc 1 packet 2 6/15/2015     Sig - Route: 1 packet daily. 30-40 mmHg thigh high compression stockings - Does not apply    Class: Eprescribe    Multiple Vitamins-Minerals (MULTIVITAMIN PO)        Sig - Route: Take  by mouth. - Oral    Class: Historical Med      Allergies     Sulfa Antibiotics HIVES    Seasonal Other (See Comments)    Unsure on specific allergens, itching, rhinitis, sneezing    Sulfate Other (See Comments)    Vioxx [Rofecoxib] Other (See Comments)    Feeling of \"hot and \"skin was crawling\".      Immunizations History as of 6/15/2017     Name Date    HERPES ZOSTER SHINGLES 2/10/2015    INFLUENZA QUADRIVALENT 10/27/2016, 10/8/2015    Influenza 12/4/2014, 12/2/2013    Tdap 3/29/2012      Problem List as of 6/15/2017     Depressive disorder, not elsewhere classified    Gregertoe    Hyperlipidemia LDL goal <100    Tobacco use disorder    Primary osteoarthritis of first carpometacarpal joint of left hand    Elevated liver enzymes    Obstructive sleep apnea              Patient Instructions                                                               How does Cologuard work?  Every day the lining of your colon naturally sheds cells. If you have 
cancer or precancer in your colon, abnormal cells shed into the colon - along with normal cells - where they are picked up by stool as it passes through.  Cologuard uses advanced stool DNA technology to find elevated levels of altered DNA and/or hemoglobin in these abnormal cells, which could be associated with cancer or precancer.    How is a sample collected?  The Cologuard Collection Kit is easy to use, and it’s shipped directly to your home. You collect a single stool sample using this kit, then send it to [employer] via prepaid UPS shipping or pick-up. We provide the result back to your doctor, who will contact you to discuss next steps.     What are the risks and precautions associated with Cologuard?  Cologuard is not for everyone.  It is not a replacement for diagnostic or surveillance colonoscopy in high-risk individuals.  It may not be right for you if:  · You have a personal history of colon cancer, polyps, or other related cancers  · You have a family history of colon cancer  · You have had a positive result for another screening method in the last six months  · You have been diagnosed with a condition that places you at high risk for colon cancer. These include but are not limited to: Inflammatory Bowel Disease, Chronic ulcerative colitis, Crohn’s disease, Familial adenomatous polyposis    Cologuard may produce false positive or false negative results.  A false positive occurs when Cologuard produces a positive result, even though a colonoscopy will not find cancer or precancer. Any positive result should be followed by diagnostic colonoscopy.  A false negative occurs when Cologuard does not detect colon cancer or precancer even when a colonoscopy identifies the positive result. Following a negative result, patients should continue participating in a screening program at an interval and with a method appropriate for the individual patient.    Ready to proceed with Cologuard?  Please let your 
provider or nursing staff know you'd like to proceed with Cologuard. Please don't hesitate to call us with any further questions or concerns your may have. You may want to contact your insurance to confirm coverage for this test. Your insurance will ask for this code: CPT 07816    You can find more information at [email or web address]    (A message has been sent to our pre-certification team to check coverage of Cologuard. They also ask that you call your insurance as well. Thanks, Sima SOLARES LPN 6/15/2017)        Colonoscopy  Colonoscopy is used to view the inside of your lower digestive tract (colon and rectum). It can help screen for colon cancer and can help find the source of abdominal pain, bleeding, and changes in bowel habits. The test is usually done in the hospital on an outpatient basis. During the exam, the doctor can remove a small tissue sample ( a biopsy) for testing. Small growths, such as polyps, may also be removed during colonoscopy.     A camera attached to a flexible tube with a viewing lens is used to take video pictures.   Getting ready   · Be sure to tell your doctor about any medications you take. Also tell your doctor about any health conditions you may have.  · Discuss the risks of the test with your doctor. These include bleeding and bowel puncture.  · Your rectum and colon must be empty for the test. So be sure to follow the diet and bowel prep instructions exactly. If you don’t, the test may need to be rescheduled.  · Ask your doctor whether you need to have a friend or family member prepared to drive you home after the test.     Colonoscopy provides an inside view of the entire colon.   During the test   · You are given sedating (relaxing) medication through an IV line. You may be drowsy or completely asleep.  · The procedure takes 30 minutes or longer.  · The doctor performs a digital rectal exam to check for anal and rectal problems. The rectum is lubricated and the scope 
inserted.  · If you are awake, you may have a feeling similar to needing to have a bowel movement. You may also feel pressure as air is pumped into the colon. It’s OK to pass gas during the procedure.  After the test   · You may discuss the results with your doctor right away or at a future visit.  · Try to pass all the gas right after the test to help prevent bloating and cramping.  · After the test, you can go back to your normal eating and other activities.  Risks and possible complications include:  · Bleeding · A puncture or tear in the colon · Risks of anesthesia       © 5572-3879 [employer]. [address] [place] [address], [place] [address]. All rights reserved. This information is not intended as a substitute for professional medical care. Always follow your healthcare professional's instructions.             
Thrombocytopenia

## 2021-03-10 NOTE — PROGRESS NOTE ADULT - PROBLEM SELECTOR PLAN 8
Transition from Lovenox to Eliquis for AC post discharge.   IMPROVE VTE Individual Risk Assessment        RISK                                                          Points  [  ] Previous VTE                                                3  [  ] Thrombophilia                                             2  [  ] Lower limb paralysis                                   2        (unable to hold up >15 seconds)    [  ] Current Cancer                                             2         (within 6 months)  [  ] Immobilization > 24 hrs                              1  [  ] ICU/CCU stay > 24 hours                             1  [ x ] Age > 60                                                         1    IMPROVE VTE Score:         [    1     ] Statement Selected

## 2021-03-10 NOTE — PROGRESS NOTE ADULT - PROBLEM SELECTOR PLAN 4
Patient presenting with SYLVESTER x2 weeks and new pleuritic chest pain, sec to chf/afib/pleural effusion, PE ruled out.  -Off supplemental oxygen now.   - Pulmonology (Dr Olea) following.
Trops now flat, not consistent with ACS/plaque rupture. More likely demand ischemia from overload/rapid Afib  - Started on FD aspirin and atorvastatin by cardio.

## 2021-03-10 NOTE — PROGRESS NOTE ADULT - ASSESSMENT
81yo male with PMH of anxiety, HFpEF (EF 69% in 2015, diastolic dysfunction) and prostate cancer s/p radiation (2012) presents with a chief complaint of rapid heart rate admitted for new onset AFib with RVR and acute on chronic diastolic congestive heart failure.

## 2021-03-10 NOTE — PROGRESS NOTE ADULT - PROBLEM SELECTOR PLAN 7
Patient presenting with platelets 115, stable.  - Per chart review, CBC in 11/2020 at PMD's office also showed platelets of 113   - ? ITP history  - Monitor CBC.
Lovenox 80mg BID  IMPROVE VTE Individual Risk Assessment        RISK                                                          Points  [  ] Previous VTE                                                3  [  ] Thrombophilia                                             2  [  ] Lower limb paralysis                                   2        (unable to hold up >15 seconds)    [  ] Current Cancer                                             2         (within 6 months)  [  ] Immobilization > 24 hrs                              1  [  ] ICU/CCU stay > 24 hours                             1  [ x ] Age > 60                                                         1    IMPROVE VTE Score:         [    1     ]

## 2021-03-10 NOTE — PROGRESS NOTE ADULT - PROBLEM SELECTOR PLAN 6
Chronic  - Continue home Paxil 10mg.
Patient presenting with platelets 115, stable in 120s today.  - Per chart review, CBC in 11/2020 at PMD's office also showed platelets of 113   - ? ITP history  - Monitor CBC closely while on Lovenox

## 2021-03-10 NOTE — PROGRESS NOTE ADULT - SUBJECTIVE AND OBJECTIVE BOX
[facility] Cardiology Progress Note [phone] (Dr. Richard, Luna, Jorge, Beth)    CHIEF COMPLAINT: Patient is a 80y old  Male who presents with a chief complaint of Rapid AFib, Acute decompensated CHF exacerbation (10 Mar 2021 06:21)      Follow Up Today: The patient denies any chest discomfort or shortness of breath.    HPI:  79yo male with PMH of anxiety, HFpEF (EF 69% in 2015) and prostate cancer s/p radiation (2012) presents with a chief complaint of rapid heart rate. Wife states that two weeks ago, the patient was shoveling snow and felt discomfort in his chest and noted he was having difficulty catching his breath. He also developed a cough and wife began giving him Mucinex and cough syrup, which did not improve the cough. Today, she noticed that on their daily walk the patient was walking slowly and was having difficulty breathing. She decided to check his blood pressure and the machine was not able to read his blood pressure. She then checked his pulse ox and the pulse oximeter stated that his HR was 145 and his oxygen saturation was in the low 90s. She then called her cardiologist, Dr. Richard (who has not seen the patient in the past) and he recommend that the patient come to the ED. Patient still admits shortness of breath during examination and admits pleuritic chest pain. Patient feels like his shortness of breath has worsened since being in the ED and he also admits diaphoresis. He denies lower extremity edema, however admits abdominal distension. Patient does note orthopnea and sleeps with two pillows at night. He denies any dizziness, palpitations or syncope. Patient states he is supposed to get his second COVID-19 vaccine on 3/10 and does not want to miss his appointment.    In the ED:  Vitals: Temp 97F |  | /87 | RR 18 | SpO2 95% on RA  Labs: Platelets 115, Alk phos 123, CKMB 7.1, Trop 0.085, BNP 4078  CXR: Nonspecific bibasilar interstitial prominence and trace effusions as described. Recommend clinical correlation and follow-up  EKG: Afib w/ RVR rate 143, QTc 456  Received: Cardizem 10mg IVP x3, Lasix 40mg IVP, 1L NS bolus (08 Mar 2021 17:56)      PAST MEDICAL & SURGICAL HISTORY:  Anxiety    Tinnitus    Left hydrocele    Prostate cancer  Dx 2012 s/p Radiation    History of hydrocelectomy  2015    S/P excision of lipoma  right hip 20 years ago        MEDICATIONS  (STANDING):  ALBUTerol    90 MICROgram(s) HFA Inhaler 2 Puff(s) Inhalation every 6 hours  aspirin 325 milliGRAM(s) Oral daily  atorvastatin 40 milliGRAM(s) Oral at bedtime  enoxaparin Injectable 80 milliGRAM(s) SubCutaneous two times a day  furosemide   Injectable 40 milliGRAM(s) IV Push every 24 hours  metoprolol tartrate 50 milliGRAM(s) Oral three times a day  PARoxetine 10 milliGRAM(s) Oral at bedtime    MEDICATIONS  (PRN):  acetaminophen   Tablet .. 650 milliGRAM(s) Oral every 4 hours PRN Mild Pain (1 - 3)      Allergies    No Known Allergies    Intolerances        REVIEW OF SYSTEMS:    All other review of systems is negative unless indicated above    Vital Signs Last 24 Hrs  T(C): 36.4 (10 Mar 2021 05:31), Max: 36.4 (10 Mar 2021 05:31)  T(F): 97.6 (10 Mar 2021 05:31), Max: 97.6 (10 Mar 2021 05:31)  HR: 96 (10 Mar 2021 05:31) (96 - 117)  BP: 98/71 (10 Mar 2021 05:31) (94/67 - 105/82)  BP(mean): --  RR: 16 (10 Mar 2021 05:31) (16 - 16)  SpO2: 92% (10 Mar 2021 05:31) (91% - 94%)    I&O's Summary    08 Mar 2021 07:01  -  09 Mar 2021 07:00  --------------------------------------------------------  IN: 300 mL / OUT: 0 mL / NET: 300 mL        PHYSICAL EXAM:    Constitutional: NAD, awake and alert, well-developed  Eyes:  EOMI,  Pupils round, No oral cyanosis.  HEENT: No exudate or erythema  Pulmonary: Non-labored, breath sounds are clear bilaterally, No wheezing, rales or rhonchi  Cardiovascular: Regular, S1 and S2, No murmurs, rubs, gallops oir clicks  Gastrointestinal: Bowel Sounds present, soft, nontender.   Ext: No significant LE edema with good pulses x 4  Neurological: Alert, no gross focal motor deficits  Skin: No rashes.  Psych:  Mood & affect appropriate    LABS: All Labs Reviewed:                        13.8   6.28  )-----------( 122      ( 09 Mar 2021 09:53 )             42.7                         13.6   5.62  )-----------( 115      ( 08 Mar 2021 15:39 )             40.6     09 Mar 2021 09:53    141    |  105    |  22     ----------------------------<  134    4.0     |  29     |  1.20   08 Mar 2021 15:39    140    |  106    |  19     ----------------------------<  107    4.3     |  30     |  1.30     Ca    9.1        09 Mar 2021 09:53  Ca    9.4        08 Mar 2021 15:39  Mg     2.2       08 Mar 2021 15:39    TPro  6.8    /  Alb  3.4    /  TBili  1.3    /  DBili  x      /  AST  38     /  ALT  61     /  AlkPhos  106    09 Mar 2021 09:53  TPro  6.9    /  Alb  3.5    /  TBili  0.9    /  DBili  x      /  AST  50     /  ALT  66     /  AlkPhos  123    08 Mar 2021 15:39    PT/INR - ( 08 Mar 2021 16:47 )   PT: 13.5 sec;   INR: 1.16 ratio         PTT - ( 08 Mar 2021 16:47 )  PTT:33.3 sec  CARDIAC MARKERS ( 09 Mar 2021 09:53 )  .080 ng/mL / x     / 247 U/L / x     / 5.6 ng/mL  CARDIAC MARKERS ( 08 Mar 2021 20:27 )  .085 ng/mL / x     / 288 U/L / x     / 6.7 ng/mL  CARDIAC MARKERS ( 08 Mar 2021 15:39 )  .085 ng/mL / x     / x     / x     / 7.1 ng/mL      Blood Culture:   03-08 @ 15:39  Pro Bnp 4078    03-08 @ 15:39  TSH: 1.61      RADIOLOGY/EKG:    Attending Attestation:   20 minutes spent on total encounter; more than 50% of the visit was spent counseling and/or coordinating care by the attending physician.     ASSESSMENT AND PLAN [facility] Cardiology Progress Note [phone] (Dr. Richard, Luna, Jorge, Beth)    CHIEF COMPLAINT: Patient is a 80y old  Male who presents with a chief complaint of Rapid AFib, Acute decompensated CHF exacerbation (10 Mar 2021 06:21)      Follow Up Today: The patient denies any chest discomfort or shortness of breath. Nurse and patient admit to nausea all day yesterday when he got up.  No vomiting.  SLept well.  Heart rates on monitor still not well controlled 95 - 120 bpm.    HPI:  81yo male with PMH of anxiety, HFpEF (EF 69% in 2015) and prostate cancer s/p radiation (2012) presents with a chief complaint of rapid heart rate. Wife states that two weeks ago, the patient was shoveling snow and felt discomfort in his chest and noted he was having difficulty catching his breath. He also developed a cough and wife began giving him Mucinex and cough syrup, which did not improve the cough. Today, she noticed that on their daily walk the patient was walking slowly and was having difficulty breathing. She decided to check his blood pressure and the machine was not able to read his blood pressure. She then checked his pulse ox and the pulse oximeter stated that his HR was 145 and his oxygen saturation was in the low 90s. She then called her cardiologist, Dr. Richard (who has not seen the patient in the past) and he recommend that the patient come to the ED. Patient still admits shortness of breath during examination and admits pleuritic chest pain. Patient feels like his shortness of breath has worsened since being in the ED and he also admits diaphoresis. He denies lower extremity edema, however admits abdominal distension. Patient does note orthopnea and sleeps with two pillows at night. He denies any dizziness, palpitations or syncope. Patient states he is supposed to get his second COVID-19 vaccine on 3/10 and does not want to miss his appointment.    In the ED:  Vitals: Temp 97F |  | /87 | RR 18 | SpO2 95% on RA  Labs: Platelets 115, Alk phos 123, CKMB 7.1, Trop 0.085, BNP 4078  CXR: Nonspecific bibasilar interstitial prominence and trace effusions as described. Recommend clinical correlation and follow-up  EKG: Afib w/ RVR rate 143, QTc 456  Received: Cardizem 10mg IVP x3, Lasix 40mg IVP, 1L NS bolus (08 Mar 2021 17:56)      PAST MEDICAL & SURGICAL HISTORY:  Anxiety    Tinnitus    Left hydrocele    Prostate cancer  Dx 2012 s/p Radiation    History of hydrocelectomy  2015    S/P excision of lipoma  right hip 20 years ago        MEDICATIONS  (STANDING):  ALBUTerol    90 MICROgram(s) HFA Inhaler 2 Puff(s) Inhalation every 6 hours  aspirin 325 milliGRAM(s) Oral daily  atorvastatin 40 milliGRAM(s) Oral at bedtime  enoxaparin Injectable 80 milliGRAM(s) SubCutaneous two times a day  furosemide   Injectable 40 milliGRAM(s) IV Push every 24 hours  metoprolol tartrate 50 milliGRAM(s) Oral three times a day  PARoxetine 10 milliGRAM(s) Oral at bedtime    MEDICATIONS  (PRN):  acetaminophen   Tablet .. 650 milliGRAM(s) Oral every 4 hours PRN Mild Pain (1 - 3)      Allergies    No Known Allergies    Intolerances        REVIEW OF SYSTEMS:    All other review of systems is negative unless indicated above    Vital Signs Last 24 Hrs  T(C): 36.4 (10 Mar 2021 05:31), Max: 36.4 (10 Mar 2021 05:31)  T(F): 97.6 (10 Mar 2021 05:31), Max: 97.6 (10 Mar 2021 05:31)  HR: 96 (10 Mar 2021 05:31) (96 - 117)  BP: 98/71 (10 Mar 2021 05:31) (94/67 - 105/82)  BP(mean): --  RR: 16 (10 Mar 2021 05:31) (16 - 16)  SpO2: 92% (10 Mar 2021 05:31) (91% - 94%)    I&O's Summary    08 Mar 2021 07:01  -  09 Mar 2021 07:00  --------------------------------------------------------  IN: 300 mL / OUT: 0 mL / NET: 300 mL        PHYSICAL EXAM:    Constitutional: NAD, awake and alert, well-developed  Eyes:  EOMI,  Pupils round, No oral cyanosis.  HEENT: No exudate or erythema  Pulmonary: Non-labored, breath sounds are clear bilaterally, No wheezing, rales or rhonchi  Cardiovascular: Irrregularly irregular, S1 and S2, sys murmur  Gastrointestinal: Bowel Sounds present, soft, nontender.   Ext: No significant LE edema   Neurological: Alert, no gross focal motor deficits  Skin: No rashes.  Psych:  Mood & affect appropriate    LABS: All Labs Reviewed:                        13.8   6.28  )-----------( 122      ( 09 Mar 2021 09:53 )             42.7                         13.6   5.62  )-----------( 115      ( 08 Mar 2021 15:39 )             40.6     09 Mar 2021 09:53    141    |  105    |  22     ----------------------------<  134    4.0     |  29     |  1.20   08 Mar 2021 15:39    140    |  106    |  19     ----------------------------<  107    4.3     |  30     |  1.30     Ca    9.1        09 Mar 2021 09:53  Ca    9.4        08 Mar 2021 15:39  Mg     2.2       08 Mar 2021 15:39    TPro  6.8    /  Alb  3.4    /  TBili  1.3    /  DBili  x      /  AST  38     /  ALT  61     /  AlkPhos  106    09 Mar 2021 09:53  TPro  6.9    /  Alb  3.5    /  TBili  0.9    /  DBili  x      /  AST  50     /  ALT  66     /  AlkPhos  123    08 Mar 2021 15:39    PT/INR - ( 08 Mar 2021 16:47 )   PT: 13.5 sec;   INR: 1.16 ratio         PTT - ( 08 Mar 2021 16:47 )  PTT:33.3 sec  CARDIAC MARKERS ( 09 Mar 2021 09:53 )  .080 ng/mL / x     / 247 U/L / x     / 5.6 ng/mL  CARDIAC MARKERS ( 08 Mar 2021 20:27 )  .085 ng/mL / x     / 288 U/L / x     / 6.7 ng/mL  CARDIAC MARKERS ( 08 Mar 2021 15:39 )  .085 ng/mL / x     / x     / x     / 7.1 ng/mL      Blood Culture:   03-08 @ 15:39  Pro Bnp 4078    03-08 @ 15:39  TSH: 1.61      RADIOLOGY/EKG:    Attending Attestation:   20 minutes spent on total encounter; more than 50% of the visit was spent counseling and/or coordinating care by the attending physician.     ASSESSMENT AND PLAN

## 2021-03-10 NOTE — PROGRESS NOTE ADULT - PROBLEM SELECTOR PLAN 5
Chronic  - Continue home Paxil 10mg inpatient
Trops now flat, not consistent with ACS/plaque rupture. More likely demand ischemia from overload/rapid Afib  - Started on FD aspirin and atorvastatin by cardio.

## 2021-03-10 NOTE — PROGRESS NOTE ADULT - SUBJECTIVE AND OBJECTIVE BOX
Patient is a 80y old  Male who presents with a chief complaint of Rapid AFib, Acute decompensated CHF exacerbation (09 Mar 2021 11:17)      INTERVAL HPI/OVERNIGHT EVENTS:  Patient seen and examined. He feels better than yesterday. Breathing more easily, but hasn't been out of bed much. Rates still suboptimally controlled, so started on Digoxin this AM.     MEDICATIONS  (STANDING):  ALBUTerol    90 MICROgram(s) HFA Inhaler 2 Puff(s) Inhalation every 6 hours  apixaban 5 milliGRAM(s) Oral two times a day  aspirin enteric coated 81 milliGRAM(s) Oral daily  atorvastatin 40 milliGRAM(s) Oral at bedtime  digoxin     Tablet 125 MICROGram(s) Oral every 12 hours  furosemide   Injectable 40 milliGRAM(s) IV Push every 24 hours  metoprolol tartrate 50 milliGRAM(s) Oral three times a day  PARoxetine 10 milliGRAM(s) Oral at bedtime    MEDICATIONS  (PRN):  acetaminophen   Tablet .. 650 milliGRAM(s) Oral every 4 hours PRN Mild Pain (1 - 3)        Allergies    No Known Allergies    Intolerances        REVIEW OF SYSTEMS:  as per HPI    Vital Signs Last 24 Hrs  T(C): 36.4 (10 Mar 2021 05:31), Max: 36.4 (10 Mar 2021 05:31)  T(F): 97.6 (10 Mar 2021 05:31), Max: 97.6 (10 Mar 2021 05:31)  HR: 96 (10 Mar 2021 09:52) (96 - 117)  BP: 107/68 (10 Mar 2021 09:52) (94/67 - 107/68)  BP(mean): --  RR: 16 (10 Mar 2021 05:31) (16 - 16)  SpO2: 92% (10 Mar 2021 05:31) (91% - 94%)    PHYSICAL EXAM:  GENERAL: NAD  HEENT:  anicteric, moist mucous membranes  CHEST/LUNG:  LCTAB/L, no R/R/W  HEART:  irregular, +systolic murmur  ABDOMEN:  BS+, soft, nontender, nondistended  EXTREMITIES: no edema, cyanosis, or calf tenderness  NERVOUS SYSTEM: answers questions and follows commands appropriately    LABS:                        13.5   7.37  )-----------( 123      ( 10 Mar 2021 09:52 )             40.6   03-10    137  |  101  |  36<H>  ----------------------------<  165<H>  3.6   |  27  |  1.50<H>    Ca    8.7      10 Mar 2021 09:52  Phos  4.9     03-10  Mg     2.2     03-10    TPro  6.8  /  Alb  3.4  /  TBili  1.3<H>  /  DBili  x   /  AST  38<H>  /  ALT  61  /  AlkPhos  106  03-09    CAPILLARY BLOOD GLUCOSE              RADIOLOGY & ADDITIONAL TESTS:    Personally reviewed.     Consultant(s) Notes Reviewed:  [x] YES  [ ] NO

## 2021-03-10 NOTE — PROGRESS NOTE ADULT - SUBJECTIVE AND OBJECTIVE BOX
Date/Time Patient Seen:  		  Referring MD:   Data Reviewed	       Patient is a 80y old  Male who presents with a chief complaint of Rapid AFib, Acute decompensated CHF exacerbation (09 Mar 2021 11:48)      Subjective/HPI     PAST MEDICAL & SURGICAL HISTORY:  Anxiety    Tinnitus    Left hydrocele    Prostate cancer  Dx 2012 s/p Radiation    History of hydrocelectomy  2015    S/P excision of lipoma  right hip 20 years ago          Medication list         MEDICATIONS  (STANDING):  ALBUTerol    90 MICROgram(s) HFA Inhaler 2 Puff(s) Inhalation every 6 hours  aspirin 325 milliGRAM(s) Oral daily  atorvastatin 40 milliGRAM(s) Oral at bedtime  enoxaparin Injectable 80 milliGRAM(s) SubCutaneous two times a day  furosemide   Injectable 40 milliGRAM(s) IV Push every 24 hours  metoprolol tartrate 50 milliGRAM(s) Oral three times a day  PARoxetine 10 milliGRAM(s) Oral at bedtime    MEDICATIONS  (PRN):  acetaminophen   Tablet .. 650 milliGRAM(s) Oral every 4 hours PRN Mild Pain (1 - 3)         Vitals log        ICU Vital Signs Last 24 Hrs  T(C): 36.4 (10 Mar 2021 05:31), Max: 36.4 (10 Mar 2021 05:31)  T(F): 97.6 (10 Mar 2021 05:31), Max: 97.6 (10 Mar 2021 05:31)  HR: 96 (10 Mar 2021 05:31) (96 - 117)  BP: 98/71 (10 Mar 2021 05:31) (94/67 - 105/82)  BP(mean): --  ABP: --  ABP(mean): --  RR: 16 (10 Mar 2021 05:31) (16 - 16)  SpO2: 92% (10 Mar 2021 05:31) (91% - 94%)           Input and Output:  I&O's Detail    08 Mar 2021 07:01  -  09 Mar 2021 07:00  --------------------------------------------------------  IN:    Oral Fluid: 300 mL  Total IN: 300 mL    OUT:  Total OUT: 0 mL    Total NET: 300 mL          Lab Data                        13.8   6.28  )-----------( 122      ( 09 Mar 2021 09:53 )             42.7     03-09    141  |  105  |  22  ----------------------------<  134<H>  4.0   |  29  |  1.20    Ca    9.1      09 Mar 2021 09:53  Mg     2.2     03-08    TPro  6.8  /  Alb  3.4  /  TBili  1.3<H>  /  DBili  x   /  AST  38<H>  /  ALT  61  /  AlkPhos  106  03-09      CARDIAC MARKERS ( 09 Mar 2021 09:53 )  .080 ng/mL / x     / 247 U/L / x     / 5.6 ng/mL  CARDIAC MARKERS ( 08 Mar 2021 20:27 )  .085 ng/mL / x     / 288 U/L / x     / 6.7 ng/mL  CARDIAC MARKERS ( 08 Mar 2021 15:39 )  .085 ng/mL / x     / x     / x     / 7.1 ng/mL        Review of Systems	      Objective     Physical Examination    heart s1s2  lung dec BS  head nc  on ra    Pertinent Lab findings & Imaging      Tara:  NO   Adequate UO     I&O's Detail    08 Mar 2021 07:01  -  09 Mar 2021 07:00  --------------------------------------------------------  IN:    Oral Fluid: 300 mL  Total IN: 300 mL    OUT:  Total OUT: 0 mL    Total NET: 300 mL               Discussed with:     Cultures:	        Radiology

## 2021-03-10 NOTE — PROGRESS NOTE ADULT - PROBLEM SELECTOR PLAN 2
Patient presenting with SYLVESTER for 2 weeks  - CXR shows Nonspecific bibasilar interstitial prominence and trace effusions as described  - Received 40mg IVP Lasix, continue Lasix, will discuss possible increse to BID.  - Strict Is/Os, 1L fluid restriction  - CXR more congested today.  - ECHO in 2015 from Dr. Solano's group showed EF 69%, mild MR, diastolic LV dysfunction.  - TTE done, poor quality study, +MR, normal LV systolic function.
Patient presenting with SYLVESTER for 2 weeks  - CXR shows Nonspecific bibasilar interstitial prominence and trace effusions as described  - Off supplemental oxygen today and lungs clear. F/u CXR. Continue Lasix 40mg IV q24.  - Strict Is/Os, 1L fluid restriction  - ECHO in 2015 from Dr. Solano's group showed EF 69%, mild MR, diastolic LV dysfunction.  - TTE done, poor quality study, +MR, normal LV systolic function. Repeat outpatient per cardio.

## 2021-03-10 NOTE — PROVIDER CONTACT NOTE (OTHER) - SITUATION
Nurse is unable to run dig loading dose on medsurg/tele floor.
pt states he gets very dizzy and nauseous when ambulating and this is not normal for him. This just started today as per patient.
Pt in rapid A Fib, 's

## 2021-03-11 ENCOUNTER — TRANSCRIPTION ENCOUNTER (OUTPATIENT)
Age: 81
End: 2021-03-11

## 2021-03-11 VITALS
SYSTOLIC BLOOD PRESSURE: 102 MMHG | TEMPERATURE: 98 F | RESPIRATION RATE: 16 BRPM | DIASTOLIC BLOOD PRESSURE: 74 MMHG | OXYGEN SATURATION: 92 % | HEART RATE: 97 BPM

## 2021-03-11 LAB
ANION GAP SERPL CALC-SCNC: 8 MMOL/L — SIGNIFICANT CHANGE UP (ref 5–17)
BUN SERPL-MCNC: 37 MG/DL — HIGH (ref 7–23)
CALCIUM SERPL-MCNC: 8.7 MG/DL — SIGNIFICANT CHANGE UP (ref 8.5–10.1)
CHLORIDE SERPL-SCNC: 101 MMOL/L — SIGNIFICANT CHANGE UP (ref 96–108)
CO2 SERPL-SCNC: 28 MMOL/L — SIGNIFICANT CHANGE UP (ref 22–31)
CREAT SERPL-MCNC: 1.3 MG/DL — SIGNIFICANT CHANGE UP (ref 0.5–1.3)
GLUCOSE SERPL-MCNC: 117 MG/DL — HIGH (ref 70–99)
HCT VFR BLD CALC: 40.9 % — SIGNIFICANT CHANGE UP (ref 39–50)
HGB BLD-MCNC: 13.7 G/DL — SIGNIFICANT CHANGE UP (ref 13–17)
MCHC RBC-ENTMCNC: 30.2 PG — SIGNIFICANT CHANGE UP (ref 27–34)
MCHC RBC-ENTMCNC: 33.5 GM/DL — SIGNIFICANT CHANGE UP (ref 32–36)
MCV RBC AUTO: 90.1 FL — SIGNIFICANT CHANGE UP (ref 80–100)
NRBC # BLD: 0 /100 WBCS — SIGNIFICANT CHANGE UP (ref 0–0)
PLATELET # BLD AUTO: 120 K/UL — LOW (ref 150–400)
POTASSIUM SERPL-MCNC: 3.4 MMOL/L — LOW (ref 3.5–5.3)
POTASSIUM SERPL-SCNC: 3.4 MMOL/L — LOW (ref 3.5–5.3)
RBC # BLD: 4.54 M/UL — SIGNIFICANT CHANGE UP (ref 4.2–5.8)
RBC # FLD: 14.6 % — HIGH (ref 10.3–14.5)
SODIUM SERPL-SCNC: 137 MMOL/L — SIGNIFICANT CHANGE UP (ref 135–145)
WBC # BLD: 6.33 K/UL — SIGNIFICANT CHANGE UP (ref 3.8–10.5)
WBC # FLD AUTO: 6.33 K/UL — SIGNIFICANT CHANGE UP (ref 3.8–10.5)

## 2021-03-11 PROCEDURE — U0005: CPT

## 2021-03-11 PROCEDURE — 84484 ASSAY OF TROPONIN QUANT: CPT

## 2021-03-11 PROCEDURE — 71275 CT ANGIOGRAPHY CHEST: CPT

## 2021-03-11 PROCEDURE — 85730 THROMBOPLASTIN TIME PARTIAL: CPT

## 2021-03-11 PROCEDURE — 93005 ELECTROCARDIOGRAM TRACING: CPT

## 2021-03-11 PROCEDURE — 96361 HYDRATE IV INFUSION ADD-ON: CPT

## 2021-03-11 PROCEDURE — 82553 CREATINE MB FRACTION: CPT

## 2021-03-11 PROCEDURE — 84443 ASSAY THYROID STIM HORMONE: CPT

## 2021-03-11 PROCEDURE — 85025 COMPLETE CBC W/AUTO DIFF WBC: CPT

## 2021-03-11 PROCEDURE — 71045 X-RAY EXAM CHEST 1 VIEW: CPT

## 2021-03-11 PROCEDURE — 86769 SARS-COV-2 COVID-19 ANTIBODY: CPT

## 2021-03-11 PROCEDURE — 83735 ASSAY OF MAGNESIUM: CPT

## 2021-03-11 PROCEDURE — 99285 EMERGENCY DEPT VISIT HI MDM: CPT

## 2021-03-11 PROCEDURE — 82550 ASSAY OF CK (CPK): CPT

## 2021-03-11 PROCEDURE — 84100 ASSAY OF PHOSPHORUS: CPT

## 2021-03-11 PROCEDURE — 96376 TX/PRO/DX INJ SAME DRUG ADON: CPT

## 2021-03-11 PROCEDURE — 80053 COMPREHEN METABOLIC PANEL: CPT

## 2021-03-11 PROCEDURE — 99239 HOSP IP/OBS DSCHRG MGMT >30: CPT

## 2021-03-11 PROCEDURE — 94640 AIRWAY INHALATION TREATMENT: CPT

## 2021-03-11 PROCEDURE — 80048 BASIC METABOLIC PNL TOTAL CA: CPT

## 2021-03-11 PROCEDURE — 96374 THER/PROPH/DIAG INJ IV PUSH: CPT

## 2021-03-11 PROCEDURE — U0003: CPT

## 2021-03-11 PROCEDURE — 36415 COLL VENOUS BLD VENIPUNCTURE: CPT

## 2021-03-11 PROCEDURE — 85027 COMPLETE CBC AUTOMATED: CPT

## 2021-03-11 PROCEDURE — 83880 ASSAY OF NATRIURETIC PEPTIDE: CPT

## 2021-03-11 PROCEDURE — 93306 TTE W/DOPPLER COMPLETE: CPT

## 2021-03-11 PROCEDURE — 85610 PROTHROMBIN TIME: CPT

## 2021-03-11 RX ORDER — FUROSEMIDE 40 MG
40 TABLET ORAL EVERY 24 HOURS
Refills: 0 | Status: DISCONTINUED | OUTPATIENT
Start: 2021-03-11 | End: 2021-03-11

## 2021-03-11 RX ORDER — ASPIRIN/CALCIUM CARB/MAGNESIUM 324 MG
1 TABLET ORAL
Qty: 30 | Refills: 0
Start: 2021-03-11

## 2021-03-11 RX ORDER — METOPROLOL TARTRATE 50 MG
1 TABLET ORAL
Qty: 90 | Refills: 0
Start: 2021-03-11 | End: 2021-04-09

## 2021-03-11 RX ORDER — POTASSIUM CHLORIDE 20 MEQ
40 PACKET (EA) ORAL ONCE
Refills: 0 | Status: COMPLETED | OUTPATIENT
Start: 2021-03-11 | End: 2021-03-11

## 2021-03-11 RX ORDER — FUROSEMIDE 40 MG
1 TABLET ORAL
Qty: 30 | Refills: 0
Start: 2021-03-11 | End: 2021-04-09

## 2021-03-11 RX ORDER — DIGOXIN 250 MCG
1 TABLET ORAL
Qty: 60 | Refills: 0
Start: 2021-03-11 | End: 2021-04-09

## 2021-03-11 RX ORDER — ATORVASTATIN CALCIUM 80 MG/1
1 TABLET, FILM COATED ORAL
Qty: 30 | Refills: 0
Start: 2021-03-11

## 2021-03-11 RX ADMIN — APIXABAN 5 MILLIGRAM(S): 2.5 TABLET, FILM COATED ORAL at 06:24

## 2021-03-11 RX ADMIN — Medication 81 MILLIGRAM(S): at 13:27

## 2021-03-11 RX ADMIN — Medication 125 MICROGRAM(S): at 06:24

## 2021-03-11 RX ADMIN — Medication 40 MILLIGRAM(S): at 13:27

## 2021-03-11 RX ADMIN — Medication 40 MILLIEQUIVALENT(S): at 13:27

## 2021-03-11 RX ADMIN — Medication 50 MILLIGRAM(S): at 13:28

## 2021-03-11 RX ADMIN — Medication 50 MILLIGRAM(S): at 06:24

## 2021-03-11 NOTE — DISCHARGE NOTE PROVIDER - HOSPITAL COURSE
79yo male with PMH of anxiety, HFpEF (EF 69% in 2015) and prostate cancer s/p radiation (2012) presents with a chief complaint of rapid heart rate. Wife states that two weeks ago, the patient was shoveling snow and felt discomfort in his chest and noted he was having difficulty catching his breath. Prior to arrival to hospital, wife checked his pulse ox and the pulse oximeter stated that his HR was 145 and his oxygen saturation was in the low 90s.     In the ED:  Vitals: Temp 97F |  | /87 | RR 18 | SpO2 95% on RA  Labs: Platelets 115, Alk phos 123, CKMB 7.1, Trop 0.085, BNP 4078  CXR: Nonspecific bibasilar interstitial prominence and trace effusions as described. Recommend clinical correlation and follow-up  EKG: Afib w/ RVR rate 143, QTc 456    Patient was admitted for acute hypoxic respiratory failure due to acute on chronic diastolic congestive heart failure as well as atrial fibrillation with rapid ventricular response. He was treated with Cardizem, Metoprolol, and then eventually Digoxin for rate control, and for anticoagulation he was initially treated with Lovenox and then transitioned to Eliquis in anticipation of discharge home. He was followed by cardiology Dr. Richard/. He had an echo which was a poor quality study, but showed presumably normal LV systolic function. PE was ruled out with CTA. His troponins were elevated likely due to demand ischemia. He was started on statin therapy and aspirin for prevention.     Seen and examined by attending physician on day of discharge:      VITAL SIGNS:  Vital Signs Last 24 Hrs  T(C): 36.4 (03-11-21 @ 05:30), Max: 36.6 (03-10-21 @ 12:08)  T(F): 97.5 (03-11-21 @ 05:30), Max: 97.8 (03-10-21 @ 12:08)  HR: 81 (03-11-21 @ 05:30) (81 - 102)  BP: 100/66 (03-11-21 @ 05:30) (93/61 - 100/66)  BP(mean): --  RR: 16 (03-11-21 @ 05:30) (16 - 16)  SpO2: 93% (03-11-21 @ 05:30) (92% - 93%)      PHYSICAL EXAM:     GENERAL: no acute distress  HEENT: NC/AT, EOMI, neck supple, MMM  RESPIRATORY: LCTAB/L, no rhonchi, rales, or wheezing  CARDIOVASCULAR:  irregular, +systolic murmur  ABDOMINAL: soft, non-tender, non-distended, positive bowel sounds   EXTREMITIES: no clubbing, cyanosis, or edema  NEUROLOGICAL: alert and oriented x 3, non-focal  SKIN: warm, dry, intact  MUSCULOSKELETAL: no gross joint deformity    Patient is stable for discharge home and has been cleared by cardio. All meds obtained through [facility] program. Plan of care was discussed with patient's wife, Dr. Patel from cardiology, and his PCP Dr. Dennis. All are in agreement with the discharge plan and he will follow up with cardio and PCP within 1 week.     Time spent: 35 minutes

## 2021-03-11 NOTE — PROGRESS NOTE ADULT - PROBLEM SELECTOR PLAN 1
Overnight events noted - labs reviewed - VS noted -   81yo male with PMH of anxiety, HFpEF (EF 69% in 2015, diastolic dysfunction) and prostate cancer s/p radiation (2012) presents with a chief complaint of rapid heart rate admitted for new onset AFib with RVR and acute on chronic diastolic  congestive heart failure exacerbation, r/o PE  curr on BID Lovenox - AF -   CHF eval - known HFpEF -   Diuresis - cvs rx regimen optimization as per Cardio Recs  I and O  monitor VS and HD and Sat  replete lytes  Cardio eval noted - in progress - TTE report reviewed -   CXR - c/w Pulm edema - CTA chest - neg for PE - eff - atelectasis - pulm edema
am Cr pending - may need to adjust LASIX dose - Cardio f/u -   81yo male with PMH of anxiety, HFpEF (EF 69% in 2015, diastolic dysfunction) and prostate cancer s/p radiation (2012) presents with a chief complaint of rapid heart rate admitted for new onset AFib with RVR and acute on chronic diastolic  congestive heart failure exacerbation, r/o PE  curr on Eliquis - AF -   CHF eval - known HFpEF -   Diuresis - cvs rx regimen optimization as per Cardio Recs  I and O  monitor VS and HD and Sat  replete lytes  Cardio eval noted - in progress - TTE report reviewed -   CXR - c/w Pulm edema - CTA chest - neg for PE - eff - atelectasis - pulm edema.
New onset afib with RVR.  - Rate remains suboptimally controlled.  - Add Digoxin.  - Metoprolol 50 mg TID.  - CHADS-VASC of 3 points, switch from Lovenox to Eliquis.   - TSH WNL  - Monitor on remote tele  - TTE done, poor quality study, +MR, normal LV systolic function.  - Repeat echo outpatient per cardio.   - Cardiology (Dr. Richard) consulted, recs appreciated
New onset afib with RVR.  - Rate better controlled today.  - Metoprolol 50 mg TID.  - CHADS-VASC of 3 points, continue full dose Lovenox 80mg BID   - TSH WNL  - Monitor on remote tele  - TTE done, poor quality study, +MR, normal LV systolic function.  - Will discuss repeat echo with cardio.    - Cardiology (Dr. Richard) consulted, recs appreciated

## 2021-03-11 NOTE — PROGRESS NOTE ADULT - PROBLEM SELECTOR PROBLEM 1
Acute on chronic congestive heart failure, unspecified heart failure type
Acute on chronic congestive heart failure, unspecified heart failure type
Rapid atrial fibrillation
Rapid atrial fibrillation

## 2021-03-11 NOTE — PROGRESS NOTE ADULT - REASON FOR ADMISSION
Rapid AFib, Acute decompensated CHF exacerbation

## 2021-03-11 NOTE — DISCHARGE NOTE PROVIDER - NSDCMRMEDTOKEN_GEN_ALL_CORE_FT
apixaban 5 mg oral tablet: 1 tab(s) orally 2 times a day  aspirin 81 mg oral delayed release tablet: 1 tab(s) orally once a day  atorvastatin 40 mg oral tablet: 1 tab(s) orally once a day (at bedtime)  digoxin 125 mcg (0.125 mg) oral tablet: 1 tab(s) orally every 12 hours  Lasix 40 mg oral tablet: 1 tab(s) orally once a day  metoprolol tartrate 50 mg oral tablet: 1 tab(s) orally 3 times a day  PARoxetine 10 mg oral tablet: 1 tab(s) orally once a day (at bedtime)

## 2021-03-11 NOTE — DISCHARGE NOTE PROVIDER - NSDCCPCAREPLAN_GEN_ALL_CORE_FT
PRINCIPAL DISCHARGE DIAGNOSIS  Diagnosis: Rapid atrial fibrillation  Assessment and Plan of Treatment: You were found to have an abnormal heart rhythm, atrial fibrillation, it is presumend to be permanent atrial fibrillation. You have been prescribed both Metoprolol and Digoxin to control your heart rate. You have been prescribed Eliquis, a blood thinner, to reduce your risk of stroke due to atrial fibrillation. This medication puts you at risk of bleeding, so if you are injured in any way, it is important for you to seek medical attention. Monitor your heart rate occasionally or if you feel symptoms and if you are persistently having rapid heart rate 110 or higher, please seek urgent medical attention.      SECONDARY DISCHARGE DIAGNOSES  Diagnosis: Acute on chronic congestive heart failure, unspecified heart failure type  Assessment and Plan of Treatment: You were found to have heart failure with a preserved ejection fraction. You will need a repeat echo as an outpatient because the quality of your echo was poor and it was difficult to interpret. Please take Lasix daily as prescribed. Please monitor your weight and if you are losing weight quickly or gaining weight quickly, please call your cardiologist so dose can be adjusted.    Diagnosis: Creatinine elevation  Assessment and Plan of Treatment: Suspect you have mild chronic kidney disease. Your creatinine was mildly elevated while you were getting high doses of Lasix. Please follow up with your PCP and your cardiologist to recheck blood work. Your creatinine is 1.3 on day of discharge.

## 2021-03-11 NOTE — DISCHARGE NOTE NURSING/CASE MANAGEMENT/SOCIAL WORK - PATIENT PORTAL LINK FT
You can access the FollowMyHealth Patient Portal offered by [facility] by registering at the following website: [email or web address]. By joining [employer]’s FollowMyHealth portal, you will also be able to view your health information using other applications (apps) compatible with our system.

## 2021-03-11 NOTE — DISCHARGE NOTE PROVIDER - CARE PROVIDER_API CALL
Jam Angelo)  [facility] [address] [facility]  [address] [place] [address]  Phone: [phone]  Fax: [phone]  Follow Up Time: 1 week    Jam Patel)  Cardiovascular Disease  [address], [address]  [place] [address]  Phone: [phone]  Fax: [phone]  Follow Up Time: 1 week

## 2021-03-11 NOTE — PROGRESS NOTE ADULT - SUBJECTIVE AND OBJECTIVE BOX
[facility] Cardiology    CHIEF COMPLAINT: Patient is a 80y old  Male who presents with a chief complaint of Rapid AFib, Acute decompensated CHF exacerbation (11 Mar 2021 11:32)      Follow Up: [ ] Chest Pain      [ ] Dyspnea     [ ] Palpitations    [ ] Atrial Fibrillation     [ ] Ventricular Dysrhythmia    [ ] Abnormal EKG                      [ ] Abnormal Cardiac Enzymes     [ ] Valvular Disease    HPI:  79yo male with PMH of anxiety, HFpEF (EF 69% in 2015) and prostate cancer s/p radiation (2012) presents with a chief complaint of rapid heart rate. Wife states that two weeks ago, the patient was shoveling snow and felt discomfort in his chest and noted he was having difficulty catching his breath. He also developed a cough and wife began giving him Mucinex and cough syrup, which did not improve the cough. Today, she noticed that on their daily walk the patient was walking slowly and was having difficulty breathing. She decided to check his blood pressure and the machine was not able to read his blood pressure. She then checked his pulse ox and the pulse oximeter stated that his HR was 145 and his oxygen saturation was in the low 90s. She then called her cardiologist, Dr. Carrizales (who has not seen the patient in the past) and he recommend that the patient come to the ED. Patient still admits shortness of breath during examination and admits pleuritic chest pain. Patient feels like his shortness of breath has worsened since being in the ED and he also admits diaphoresis. He denies lower extremity edema, however admits abdominal distension. Patient does note orthopnea and sleeps with two pillows at night. He denies any dizziness, palpitations or syncope. Patient states he is supposed to get his second COVID-19 vaccine on 3/10 and does not want to miss his appointment.    In the ED:  Vitals: Temp 97F |  | /87 | RR 18 | SpO2 95% on RA  Labs: Platelets 115, Alk phos 123, CKMB 7.1, Trop 0.085, BNP 4078  CXR: Nonspecific bibasilar interstitial prominence and trace effusions as described. Recommend clinical correlation and follow-up  EKG: Afib w/ RVR rate 143, QTc 456  Received: Cardizem 10mg IVP x3, Lasix 40mg IVP, 1L NS bolus (08 Mar 2021 17:56)    PAST MEDICAL & SURGICAL HISTORY:  Anxiety    Tinnitus    Left hydrocele    Prostate cancer  Dx 2012 s/p Radiation    History of hydrocelectomy  2015    S/P excision of lipoma  right hip 20 years ago      MEDICATIONS  (STANDING):  ALBUTerol    90 MICROgram(s) HFA Inhaler 2 Puff(s) Inhalation every 6 hours  apixaban 5 milliGRAM(s) Oral two times a day  aspirin enteric coated 81 milliGRAM(s) Oral daily  atorvastatin 40 milliGRAM(s) Oral at bedtime  digoxin     Tablet 125 MICROGram(s) Oral every 12 hours  furosemide    Tablet 40 milliGRAM(s) Oral every 24 hours  metoprolol tartrate 50 milliGRAM(s) Oral three times a day  PARoxetine 10 milliGRAM(s) Oral at bedtime  potassium chloride    Tablet ER 40 milliEquivalent(s) Oral once    MEDICATIONS  (PRN):  acetaminophen   Tablet .. 650 milliGRAM(s) Oral every 4 hours PRN Mild Pain (1 - 3)    Allergies    No Known Allergies    Intolerances        REVIEW OF SYSTEMS:    CONSTITUTIONAL: No weakness, fevers or chills.   EYES/ENT: No visual changes;    NECK: No pain or stiffness  RESPIRATORY: No cough, wheezing, No shortness of breath  CARDIOVASCULAR: No chest pain or palpitations  GASTROINTESTINAL: No abdominal pain, or hematochezia.  GENITOURINARY: No dysuria orhematuria  NEUROLOGICAL: No numbness or weakness  SKIN: No itching, burning, rashes  All other review of systems is negative unless indicated above    Vital Signs Last 24 Hrs  T(C): 36.6 (11 Mar 2021 12:14), Max: 36.6 (11 Mar 2021 12:14)  T(F): 97.8 (11 Mar 2021 12:14), Max: 97.8 (11 Mar 2021 12:14)  HR: 97 (11 Mar 2021 12:14) (81 - 102)  BP: 102/74 (11 Mar 2021 12:14) (93/61 - 102/74)  BP(mean): --  RR: 16 (11 Mar 2021 12:14) (16 - 16)  SpO2: 92% (11 Mar 2021 12:14) (92% - 93%)  I&O's Summary      PHYSICAL EXAM:  Constitutional: NAD  Neurological: Alert, no focal deficits  HEENT: no JVD, EOMI  Cardiovascular: Regular, S1 and S2, no murmur  Pulmonary: breath sounds bilaterally  Gastrointestinal: Bowel Sounds present, soft, nontender  EXT:  no peripheral edema  Skin: No rashes.  Psych:  Mood calm  LABS: All Labs Reviewed:                          13.7   6.33  )-----------( 120      ( 11 Mar 2021 09:39 )             40.9     03-11    137  |  101  |  37<H>  ----------------------------<  117<H>  3.4<L>   |  28  |  1.30    Ca    8.7      11 Mar 2021 09:39  Phos  4.9     03-10  Mg     2.2     03-10            TTE Echo Complete w/o Contrast w/ Doppler:    EXAM:  ECHO TTE WO CON COMP W DOPP         PROCEDURE DATE:  03/09/2021        INTERPRETATION:  INDICATION: CHF, new atrial fibrillation    Conclusion:  1. This a technically limited study. Patient unable to lie on the left side.  2. There is normal left ventricular size and probable normal left ventricular systolic function. Endocardial definition was limited on this study. Wall motion abnormalities were not able to be interpreted.  3. There appears to be significant mitral regurgitation, however the mitral valve was not well visualized on this study.  4. There is moderate tricuspid regurgitation but the tricuspid valve was not well visualized either.  5. The aortic and pulmonic valves were not well-visualized.  6. There are bilateral pleural effusions noted.  7. There is no significant pericardial effusion  8. Consider repeat study to further define severity of mitral regurgitation.    Blood Pressure 101/74 mmHg         BSA 2.0 sq m    Dimensions:  LA 3.6 cm       Normal Values: 2.0 -4.0 cm  Ao 2.2 cm        Normal Values: 2.0 - 3.8 cm  IVSd 1.0 cm       Normal Values: 0.6 - 1.2 cm  PWd 1.1 cm       Normal Values: 0.6 - 1.1 cm  LVIDd 5.0 cm         Normal Values: 3.0 - 5.6 cm  LVIDs 3.1 cm         Normal Values: 1.8 - 4.0 cm                MILADY CARRIZALES MD; Attending Cardiologist  This document has been electronically signed. Mar  9 2021  9:44AM (03-09-21 @ 10:23)  Xray Chest 1 View- PORTABLE-Urgent:   EXAM:  XR CHEST PORTABLE URGENT 1V                            PROCEDURE DATE:  03/08/2021          INTERPRETATION:  Chest pressure.    AP chest.    Heart magnified by projection. Bibasilar interstitial prominence may all represent interstitial fibrotic changes. Difficult to exclude an element of congestion and/or superimposed acute infection. Recommend clinical correlation close follow-up. Trace basilar effusions.    Impression: Nonspecific bibasilar interstitial prominence and trace effusions as described. Recommend clinical correlation and follow-up    JINNY LAGUERRE MD; Attending Radiologist  This document has been electronically signed. Mar  8 2021  3:53PM (03-08-21 @ 15:49)  12 Lead ECG:   Ventricular Rate 143 BPM    Atrial Rate 133 BPM    QRS Duration 80 ms    Q-T Interval 296 ms    QTC Calculation(Bazett) 456 ms    R Axis -3 degrees    T Axis 32 degrees    Diagnosis Line Atrial fibrillation with rapid ventricular response  AbnormalECG  When compared with ECG of 08-MAR-2021 15:07, (Unconfirmed)  No significant change was found  Confirmed by Ashwin Sanchez MD ([id number]) on 3/9/2021 12:18:17 PM (03-08-21 @ 15:07)    · Assessment	  80M with h/o ITP and prostate CA who presents with new onset AF with RVR, positive trop, and acute CHF by CXR/symptoms. Clinically improving with diuretics. Still with fast heart rates.     ECHO - Very limited study,  shows normal LVEF - moderate to severe MR/TR       Suggest:    1.  s/p IV lasix 40mg daily   follow up CXR stable  2. Change Lovenox to Eliquis  3. Will need outpatient stress test  4. Change aspirin 81mg daily, C/w Lipitor 40mg daily  5. Continue w/ Metoprolol 50 q8 along with adding Digoxin for rate control.  6. Will follow outpt with Dr carrizales in 1-2 weeks    lasix 40 po qday

## 2021-03-11 NOTE — PROGRESS NOTE ADULT - SUBJECTIVE AND OBJECTIVE BOX
Date/Time Patient Seen:  		  Referring MD:   Data Reviewed	       Patient is a 80y old  Male who presents with a chief complaint of Rapid AFib, Acute decompensated CHF exacerbation (10 Mar 2021 11:21)      Subjective/HPI     PAST MEDICAL & SURGICAL HISTORY:  Anxiety    Tinnitus    Left hydrocele    Prostate cancer  Dx 2012 s/p Radiation    History of hydrocelectomy  2015    S/P excision of lipoma  right hip 20 years ago          Medication list         MEDICATIONS  (STANDING):  ALBUTerol    90 MICROgram(s) HFA Inhaler 2 Puff(s) Inhalation every 6 hours  apixaban 5 milliGRAM(s) Oral two times a day  aspirin enteric coated 81 milliGRAM(s) Oral daily  atorvastatin 40 milliGRAM(s) Oral at bedtime  digoxin     Tablet 125 MICROGram(s) Oral every 12 hours  furosemide   Injectable 40 milliGRAM(s) IV Push every 24 hours  metoprolol tartrate 50 milliGRAM(s) Oral three times a day  PARoxetine 10 milliGRAM(s) Oral at bedtime    MEDICATIONS  (PRN):  acetaminophen   Tablet .. 650 milliGRAM(s) Oral every 4 hours PRN Mild Pain (1 - 3)         Vitals log        ICU Vital Signs Last 24 Hrs  T(C): 36.4 (11 Mar 2021 05:30), Max: 36.6 (10 Mar 2021 12:08)  T(F): 97.5 (11 Mar 2021 05:30), Max: 97.8 (10 Mar 2021 12:08)  HR: 81 (11 Mar 2021 05:30) (81 - 102)  BP: 100/66 (11 Mar 2021 05:30) (93/61 - 107/68)  BP(mean): --  ABP: --  ABP(mean): --  RR: 16 (11 Mar 2021 05:30) (16 - 16)  SpO2: 93% (11 Mar 2021 05:30) (92% - 93%)           Input and Output:  I&O's Detail      Lab Data                        13.5   7.37  )-----------( 123      ( 10 Mar 2021 09:52 )             40.6     03-10    137  |  101  |  36<H>  ----------------------------<  165<H>  3.6   |  27  |  1.50<H>    Ca    8.7      10 Mar 2021 09:52  Phos  4.9     03-10  Mg     2.2     03-10    TPro  6.8  /  Alb  3.4  /  TBili  1.3<H>  /  DBili  x   /  AST  38<H>  /  ALT  61  /  AlkPhos  106  03-09      CARDIAC MARKERS ( 09 Mar 2021 09:53 )  .080 ng/mL / x     / 247 U/L / x     / 5.6 ng/mL        Review of Systems	      Objective     Physical Examination    heart s1s2  lung dc BS  abd soft  head nc  on RA    Pertinent Lab findings & Imaging      Pacheco:  NO   Adequate UO     I&O's Detail           Discussed with:     Cultures:	        Radiology

## 2021-03-11 NOTE — DISCHARGE NOTE PROVIDER - PROVIDER TOKENS
PROVIDER:[TOKEN:[[id number]],FOLLOWUP:[1 week]],PROVIDER:[TOKEN:[[id number]],FOLLOWUP:[1 week]]

## 2021-03-12 PROBLEM — F41.9 ANXIETY DISORDER, UNSPECIFIED: Chronic | Status: ACTIVE | Noted: 2021-03-08

## 2021-03-15 ENCOUNTER — NON-APPOINTMENT (OUTPATIENT)
Age: 81
End: 2021-03-15

## 2021-03-16 ENCOUNTER — APPOINTMENT (OUTPATIENT)
Dept: INTERNAL MEDICINE | Facility: CLINIC | Age: 81
End: 2021-03-16
Payer: MEDICARE

## 2021-03-16 ENCOUNTER — NON-APPOINTMENT (OUTPATIENT)
Age: 81
End: 2021-03-16

## 2021-03-16 VITALS
TEMPERATURE: 97.3 F | DIASTOLIC BLOOD PRESSURE: 65 MMHG | SYSTOLIC BLOOD PRESSURE: 94 MMHG | OXYGEN SATURATION: 92 % | HEART RATE: 82 BPM | WEIGHT: 177 LBS | BODY MASS INDEX: 24.69 KG/M2

## 2021-03-16 VITALS — SYSTOLIC BLOOD PRESSURE: 94 MMHG | DIASTOLIC BLOOD PRESSURE: 60 MMHG

## 2021-03-16 PROCEDURE — 36415 COLL VENOUS BLD VENIPUNCTURE: CPT

## 2021-03-16 PROCEDURE — 99496 TRANSJ CARE MGMT HIGH F2F 7D: CPT | Mod: 25

## 2021-03-16 PROCEDURE — 93000 ELECTROCARDIOGRAM COMPLETE: CPT

## 2021-03-18 LAB
ALBUMIN SERPL ELPH-MCNC: 3.7 G/DL
ALP BLD-CCNC: 107 U/L
ALT SERPL-CCNC: 42 U/L
ANION GAP SERPL CALC-SCNC: 11 MMOL/L
AST SERPL-CCNC: 28 U/L
BASOPHILS # BLD AUTO: 0.02 K/UL
BASOPHILS NFR BLD AUTO: 0.4 %
BILIRUB SERPL-MCNC: 1 MG/DL
BUN SERPL-MCNC: 21 MG/DL
CALCIUM SERPL-MCNC: 9.2 MG/DL
CHLORIDE SERPL-SCNC: 100 MMOL/L
CO2 SERPL-SCNC: 28 MMOL/L
CREAT SERPL-MCNC: 1.24 MG/DL
DIGOXIN SERPL-MCNC: 0.8 NG/ML
EOSINOPHIL # BLD AUTO: 0.19 K/UL
EOSINOPHIL NFR BLD AUTO: 3.6 %
GLUCOSE SERPL-MCNC: 82 MG/DL
HCT VFR BLD CALC: 43.3 %
HGB BLD-MCNC: 13.9 G/DL
IMM GRANULOCYTES NFR BLD AUTO: 0.2 %
LYMPHOCYTES # BLD AUTO: 1.16 K/UL
LYMPHOCYTES NFR BLD AUTO: 21.8 %
MAN DIFF?: NORMAL
MCHC RBC-ENTMCNC: 29.8 PG
MCHC RBC-ENTMCNC: 32.1 GM/DL
MCV RBC AUTO: 92.9 FL
MONOCYTES # BLD AUTO: 0.83 K/UL
MONOCYTES NFR BLD AUTO: 15.6 %
NEUTROPHILS # BLD AUTO: 3.11 K/UL
NEUTROPHILS NFR BLD AUTO: 58.4 %
PLATELET # BLD AUTO: 125 K/UL
POTASSIUM SERPL-SCNC: 3.7 MMOL/L
PROT SERPL-MCNC: 6 G/DL
RBC # BLD: 4.66 M/UL
RBC # FLD: 14.9 %
SODIUM SERPL-SCNC: 139 MMOL/L
T3 SERPL-MCNC: 77 NG/DL
T4 FREE SERPL-MCNC: 1.4 NG/DL
TSH SERPL-ACNC: 2.22 UIU/ML
WBC # FLD AUTO: 5.32 K/UL

## 2021-04-01 ENCOUNTER — NON-APPOINTMENT (OUTPATIENT)
Age: 81
End: 2021-04-01

## 2021-04-01 ENCOUNTER — APPOINTMENT (OUTPATIENT)
Dept: INTERNAL MEDICINE | Facility: CLINIC | Age: 81
End: 2021-04-01
Payer: MEDICARE

## 2021-04-01 VITALS
HEART RATE: 70 BPM | BODY MASS INDEX: 24.27 KG/M2 | DIASTOLIC BLOOD PRESSURE: 65 MMHG | WEIGHT: 174 LBS | SYSTOLIC BLOOD PRESSURE: 88 MMHG | OXYGEN SATURATION: 91 % | TEMPERATURE: 97.3 F

## 2021-04-01 VITALS — HEART RATE: 68 BPM | DIASTOLIC BLOOD PRESSURE: 60 MMHG | SYSTOLIC BLOOD PRESSURE: 88 MMHG

## 2021-04-01 VITALS — RESPIRATION RATE: 12 BRPM | HEART RATE: 68 BPM | SYSTOLIC BLOOD PRESSURE: 88 MMHG | DIASTOLIC BLOOD PRESSURE: 60 MMHG

## 2021-04-01 PROCEDURE — 36415 COLL VENOUS BLD VENIPUNCTURE: CPT

## 2021-04-01 PROCEDURE — 99214 OFFICE O/P EST MOD 30 MIN: CPT | Mod: 25

## 2021-04-01 PROCEDURE — 93000 ELECTROCARDIOGRAM COMPLETE: CPT

## 2021-04-01 NOTE — HISTORY OF PRESENT ILLNESS
[FreeTextEntry1] : ROUTINE FOLLOW UP  [de-identified] : PATIENT W/ RECENT A FIB , CHF FOR ROUTINE FOLLOW UP , LAST VISIT HIS DIURETIC WAS REDUCED 2/2 LIGHTHEADEDNESS AS WELL AS HIS METOPROLOL 2/2 LOW SBP , TODAY HE IS FEELING BETTER W/O CHEST PAINS , DYSPNEA BUT C/O LACK OF ENERGY . HIS TFT'S WERE NORMAL RECENTLY AS WERE HIS H/H AND CMP .

## 2021-04-01 NOTE — PHYSICAL EXAM
[No Acute Distress] : no acute distress [No Lymphadenopathy] : no lymphadenopathy [Thyroid Normal, No Nodules] : the thyroid was normal and there were no nodules present [No Murmur] : no murmur heard [No Carotid Bruits] : no carotid bruits [No Edema] : there was no peripheral edema [Normal] : soft, non-tender, non-distended, no masses palpated, no HSM and normal bowel sounds [de-identified] : S1 S2 IRREGULARLY IRREGUALR

## 2021-04-01 NOTE — REVIEW OF SYSTEMS
[Negative] : Gastrointestinal [Chest Pain] : no chest pain [Palpitations] : no palpitations [Lower Ext Edema] : no lower extremity edema [Orthopena] : no orthopnea [Paroxysmal Nocturnal Dyspnea] : no paroxysmal nocturnal dyspnea

## 2021-04-01 NOTE — REVIEW OF SYSTEMS
[Negative] : Genitourinary [Chest Pain] : no chest pain [Palpitations] : no palpitations [Lower Ext Edema] : no lower extremity edema [Orthopena] : no orthopnea [Paroxysmal Nocturnal Dyspnea] : no paroxysmal nocturnal dyspnea

## 2021-04-01 NOTE — HISTORY OF PRESENT ILLNESS
[Post-hospitalization from ___ Hospital] : Post-hospitalization from [unfilled] Hospital [Discharge Summary] : discharge summary [Admitted on: ___] : The patient was admitted on [unfilled] [Discharged on ___] : discharged on [unfilled] [FreeTextEntry2] : S/P RECENT HOSPITALIZATION FOR CHF , RAPID AFIB TREATED W/ DIURETICS AND RATE CONTROL W/ IMPROVEMENT . PATIENT FEELS WELL AT PRESENT , DENIES ANY CP , DYSPNEA , HE WAS ALSO STARTED ON ELIQUIS , SINCE D/C HIS BP HAS BEEN ON THE LOW SIDE (MID 90'S) AND HIS METOPROLOL WAS DECREASED FROM 50 MG TID TO BID , DENIES ANY LIGHTHEADEDNESS .

## 2021-04-01 NOTE — PHYSICAL EXAM
[No Acute Distress] : no acute distress [Normal Sclera/Conjunctiva] : normal sclera/conjunctiva [No Lymphadenopathy] : no lymphadenopathy [Thyroid Normal, No Nodules] : the thyroid was normal and there were no nodules present [No Murmur] : no murmur heard [No Carotid Bruits] : no carotid bruits [No Abdominal Bruit] : a ~M bruit was not heard ~T in the abdomen [No Edema] : there was no peripheral edema [Normal] : soft, non-tender, non-distended, no masses palpated, no HSM and normal bowel sounds [No Joint Swelling] : no joint swelling [No Focal Deficits] : no focal deficits [Normal Insight/Judgement] : insight and judgment were intact [de-identified] : SLIGHT DECREASE BS RIGHT BASE  [de-identified] : IRREGULARLY IRREGULAR RHYTHM

## 2021-04-15 ENCOUNTER — TRANSCRIPTION ENCOUNTER (OUTPATIENT)
Age: 81
End: 2021-04-15

## 2021-04-20 ENCOUNTER — APPOINTMENT (OUTPATIENT)
Dept: INTERNAL MEDICINE | Facility: CLINIC | Age: 81
End: 2021-04-20
Payer: MEDICARE

## 2021-04-20 VITALS
HEART RATE: 107 BPM | SYSTOLIC BLOOD PRESSURE: 100 MMHG | WEIGHT: 177 LBS | TEMPERATURE: 97.6 F | BODY MASS INDEX: 24.69 KG/M2 | OXYGEN SATURATION: 95 % | DIASTOLIC BLOOD PRESSURE: 71 MMHG

## 2021-04-20 VITALS — SYSTOLIC BLOOD PRESSURE: 98 MMHG | DIASTOLIC BLOOD PRESSURE: 70 MMHG

## 2021-04-20 PROCEDURE — 99215 OFFICE O/P EST HI 40 MIN: CPT | Mod: 25

## 2021-04-20 PROCEDURE — 36415 COLL VENOUS BLD VENIPUNCTURE: CPT

## 2021-04-20 RX ORDER — DIGOXIN 125 UG/1
125 TABLET ORAL DAILY
Qty: 90 | Refills: 0 | Status: DISCONTINUED | COMMUNITY
End: 2021-04-20

## 2021-04-20 NOTE — HISTORY OF PRESENT ILLNESS
[FreeTextEntry1] : ROUTINE FOLLOW UP  [de-identified] : PATIENT W/ H/ A FIB , PLEURAL EFFUSIONS , CHF , MR FOR ROUITNE FOLLOW UP ,  HE RECENTLY UNDERWENT A STRESS TEST WHICH WAS NORMAL , HAD REPEAT ECHO ( RESULTS PENDING) . SINCE DECREASING HIS FUROSEMIDE HE HAS NOTED SLIGHT INCREASE IN EXERTIONAL DYSPNEA AND HE HAS BEEN SLEEPING ON 2 PILLOWS , NO PND , EDEMA , HIS DIG WAS D/C BY CARDIOLOGY .

## 2021-04-20 NOTE — REVIEW OF SYSTEMS
[Orthopena] : orthopnea [Negative] : Gastrointestinal [Chest Pain] : no chest pain [Palpitations] : no palpitations [Lower Ext Edema] : no lower extremity edema [Paroxysmal Nocturnal Dyspnea] : no paroxysmal nocturnal dyspnea

## 2021-04-20 NOTE — PHYSICAL EXAM
[No Acute Distress] : no acute distress [No Lymphadenopathy] : no lymphadenopathy [Thyroid Normal, No Nodules] : the thyroid was normal and there were no nodules present [No Carotid Bruits] : no carotid bruits [No Edema] : there was no peripheral edema [Normal] : soft, non-tender, non-distended, no masses palpated, no HSM and normal bowel sounds [de-identified] : + RALES RIGHT BASE , DECREASED BS RIGHT BASE , LEFT LUNG CLEAR  [de-identified] : S1 S2 IRREGULARLY IRREGULAR , 2-3 ? SYSTOLIC MURMUR APEX

## 2021-04-21 LAB
ALBUMIN SERPL ELPH-MCNC: 3.6 G/DL
ALP BLD-CCNC: 107 U/L
ALT SERPL-CCNC: 46 U/L
ANION GAP SERPL CALC-SCNC: 12 MMOL/L
AST SERPL-CCNC: 36 U/L
BILIRUB SERPL-MCNC: 1.5 MG/DL
BUN SERPL-MCNC: 16 MG/DL
CALCIUM SERPL-MCNC: 9.2 MG/DL
CHLORIDE SERPL-SCNC: 104 MMOL/L
CHOLEST SERPL-MCNC: 108 MG/DL
CO2 SERPL-SCNC: 25 MMOL/L
CREAT SERPL-MCNC: 1.23 MG/DL
GLUCOSE SERPL-MCNC: 112 MG/DL
HDLC SERPL-MCNC: 41 MG/DL
LDLC SERPL CALC-MCNC: 53 MG/DL
NONHDLC SERPL-MCNC: 67 MG/DL
POTASSIUM SERPL-SCNC: 4.2 MMOL/L
PROT SERPL-MCNC: 5.5 G/DL
SODIUM SERPL-SCNC: 140 MMOL/L
TRIGL SERPL-MCNC: 74 MG/DL

## 2021-05-04 ENCOUNTER — APPOINTMENT (OUTPATIENT)
Dept: INTERNAL MEDICINE | Facility: CLINIC | Age: 81
End: 2021-05-04
Payer: MEDICARE

## 2021-05-04 ENCOUNTER — NON-APPOINTMENT (OUTPATIENT)
Age: 81
End: 2021-05-04

## 2021-05-04 VITALS
DIASTOLIC BLOOD PRESSURE: 59 MMHG | OXYGEN SATURATION: 97 % | HEART RATE: 67 BPM | RESPIRATION RATE: 12 BRPM | BODY MASS INDEX: 25.2 KG/M2 | HEIGHT: 71 IN | WEIGHT: 180 LBS | TEMPERATURE: 97.2 F | SYSTOLIC BLOOD PRESSURE: 91 MMHG

## 2021-05-04 VITALS — SYSTOLIC BLOOD PRESSURE: 86 MMHG | DIASTOLIC BLOOD PRESSURE: 60 MMHG

## 2021-05-04 DIAGNOSIS — R01.1 CARDIAC MURMUR, UNSPECIFIED: ICD-10-CM

## 2021-05-04 DIAGNOSIS — I95.9 HYPOTENSION, UNSPECIFIED: ICD-10-CM

## 2021-05-04 PROCEDURE — 36415 COLL VENOUS BLD VENIPUNCTURE: CPT

## 2021-05-04 PROCEDURE — 93000 ELECTROCARDIOGRAM COMPLETE: CPT

## 2021-05-04 PROCEDURE — 99215 OFFICE O/P EST HI 40 MIN: CPT | Mod: 25

## 2021-05-04 NOTE — HISTORY OF PRESENT ILLNESS
[FreeTextEntry1] : ROUTINE FOLLOW UP  [de-identified] : PATIENT W/ H/O ATRIAL FIBRILLATION W/ RVR , DIASTOLIC HFpEF , BILATERAL EFFUSIONS FOR ROUITNE FOLLOW UP , SINCE LAST VISIT HIS FUROSEMIDE WAS INCREASED TO 40 MG 2/2 INCREASING DYSPNEA , 2 PILLOW ORTHOPNEA AND DECREASED EXERCISE CAPACITY AS WELL AS WORSENING LE EDEMA , NO CHEST PAINS , COUGH , FEVER , HIS WEIGHT HAS REMAINED RELATIVELY STABLE , HIS BP HAS BEEN HOVERING AROUND 90 SYSTOLIC .

## 2021-05-04 NOTE — REVIEW OF SYSTEMS
[Lower Ext Edema] : lower extremity edema [Orthopena] : orthopnea [Dyspnea on Exertion] : dyspnea on exertion [Negative] : Genitourinary [Chest Pain] : no chest pain [Palpitations] : no palpitations [Claudication] : no  leg claudication [Paroxysmal Nocturnal Dyspnea] : no paroxysmal nocturnal dyspnea [Shortness Of Breath] : no shortness of breath [Wheezing] : no wheezing [Cough] : no cough

## 2021-05-04 NOTE — PHYSICAL EXAM
[No Acute Distress] : no acute distress [No Lymphadenopathy] : no lymphadenopathy [Thyroid Normal, No Nodules] : the thyroid was normal and there were no nodules present [No Respiratory Distress] : no respiratory distress  [Normal] : soft, non-tender, non-distended, no masses palpated, no HSM and normal bowel sounds [Normal Supraclavicular Nodes] : no supraclavicular lymphadenopathy [Normal Insight/Judgement] : insight and judgment were intact [de-identified] : DECREASED BS AND DULLNESS RIGHT LUNG  [de-identified] : S1 S2 IRREGULARLY IRREGULAR , NO MURMURS  [de-identified] : 2+ LOWER EXTREMITY PITTING EDEMA , COOL TO TOUCH , HANDS COOL TO TOUCH , RADIAL UNABLE TO PALPATE

## 2021-05-04 NOTE — ASSESSMENT
[FreeTextEntry1] : CLINICALLY NOT IMPROVING W/ RAPID AFIB , PLEURAL EFFUSION , LOW SBP , DISCUSSED W/ DR ROMERO WHO WILL SEE PATIENT THIS AFTERNOON , CMO , BNP , CBC DRAWN. HE WILL FOLLOW UP AFTER ABOVE .

## 2021-05-05 LAB
ALBUMIN SERPL ELPH-MCNC: 4 G/DL
ALP BLD-CCNC: 145 U/L
ALT SERPL-CCNC: 59 U/L
ANION GAP SERPL CALC-SCNC: 20 MMOL/L
AST SERPL-CCNC: 45 U/L
BASOPHILS # BLD AUTO: 0.02 K/UL
BASOPHILS NFR BLD AUTO: 0.3 %
BILIRUB SERPL-MCNC: 1.6 MG/DL
BUN SERPL-MCNC: 26 MG/DL
CALCIUM SERPL-MCNC: 9.4 MG/DL
CHLORIDE SERPL-SCNC: 99 MMOL/L
CO2 SERPL-SCNC: 22 MMOL/L
CREAT SERPL-MCNC: 1.43 MG/DL
EOSINOPHIL # BLD AUTO: 0.05 K/UL
EOSINOPHIL NFR BLD AUTO: 0.8 %
GLUCOSE SERPL-MCNC: 123 MG/DL
HCT VFR BLD CALC: 50.4 %
HGB BLD-MCNC: 15.4 G/DL
IMM GRANULOCYTES NFR BLD AUTO: 0.3 %
LYMPHOCYTES # BLD AUTO: 1.19 K/UL
LYMPHOCYTES NFR BLD AUTO: 17.9 %
MAN DIFF?: NORMAL
MCHC RBC-ENTMCNC: 29.7 PG
MCHC RBC-ENTMCNC: 30.6 GM/DL
MCV RBC AUTO: 97.3 FL
MONOCYTES # BLD AUTO: 0.45 K/UL
MONOCYTES NFR BLD AUTO: 6.8 %
NEUTROPHILS # BLD AUTO: 4.92 K/UL
NEUTROPHILS NFR BLD AUTO: 73.9 %
NT-PROBNP SERPL-MCNC: 4008 PG/ML
PLATELET # BLD AUTO: 132 K/UL
POTASSIUM SERPL-SCNC: 3.9 MMOL/L
PROT SERPL-MCNC: 5.7 G/DL
RBC # BLD: 5.18 M/UL
RBC # FLD: 15.8 %
SODIUM SERPL-SCNC: 141 MMOL/L
WBC # FLD AUTO: 6.65 K/UL

## 2021-05-21 ENCOUNTER — APPOINTMENT (OUTPATIENT)
Dept: INTERNAL MEDICINE | Facility: CLINIC | Age: 81
End: 2021-05-21
Payer: MEDICARE

## 2021-05-21 VITALS
SYSTOLIC BLOOD PRESSURE: 105 MMHG | OXYGEN SATURATION: 92 % | WEIGHT: 174 LBS | BODY MASS INDEX: 24.27 KG/M2 | HEART RATE: 105 BPM | DIASTOLIC BLOOD PRESSURE: 92 MMHG | TEMPERATURE: 99.7 F

## 2021-05-21 PROCEDURE — 99496 TRANSJ CARE MGMT HIGH F2F 7D: CPT

## 2021-05-21 NOTE — HISTORY OF PRESENT ILLNESS
[Post-hospitalization from ___ Hospital] : Post-hospitalization from [unfilled] Hospital [Admitted on: ___] : The patient was admitted on [unfilled] [Discharged on ___] : discharged on [unfilled] [Discharge Summary] : discharge summary [Pertinent Labs] : pertinent labs [Radiology Findings] : radiology findings [Discharge Med List] : discharge medication list [Patient Contacted By: ____] : and contacted by [unfilled] [FreeTextEntry2] : PATIENT RECENTLY HOSPITALIZED FOR RAPID AFIB , CHF AND WAS FOUND TO HAVE SEVERE MR , HE WAS HOSPITALIZED AND UNDERWENT MVR W/ PORCINE VALVE , HIS HOSPITAL COURSE WAS COMPLICATED BY COMPLETE A-V BLOCK REQUIRING PPM PLACEMENT , SINCE D/C HE FEELS MUCH BETTER BUT STILL C/O LOWER EXTREMITY EDEMA . NO COUGH , SPUTUM , FEVER , HIS THORACOTOMY WOUND IS HEALING WELL

## 2021-05-21 NOTE — PHYSICAL EXAM
[No Acute Distress] : no acute distress [No Lymphadenopathy] : no lymphadenopathy [No Murmur] : no murmur heard [No Carotid Bruits] : no carotid bruits [Normal] : soft, non-tender, non-distended, no masses palpated, no HSM and normal bowel sounds [Normal Supraclavicular Nodes] : no supraclavicular lymphadenopathy [No Focal Deficits] : no focal deficits [de-identified] : S1 S2 IRREGULARLY IRREGULAR  [de-identified] : +  [de-identified] : WELL HEALING THORACOTOMY SCAR

## 2021-06-15 ENCOUNTER — APPOINTMENT (OUTPATIENT)
Dept: INTERNAL MEDICINE | Facility: CLINIC | Age: 81
End: 2021-06-15
Payer: MEDICARE

## 2021-06-15 ENCOUNTER — NON-APPOINTMENT (OUTPATIENT)
Age: 81
End: 2021-06-15

## 2021-06-15 VITALS
WEIGHT: 158 LBS | SYSTOLIC BLOOD PRESSURE: 109 MMHG | HEART RATE: 65 BPM | TEMPERATURE: 97.9 F | OXYGEN SATURATION: 96 % | DIASTOLIC BLOOD PRESSURE: 71 MMHG | BODY MASS INDEX: 22.04 KG/M2

## 2021-06-15 VITALS — SYSTOLIC BLOOD PRESSURE: 98 MMHG | DIASTOLIC BLOOD PRESSURE: 60 MMHG

## 2021-06-15 PROCEDURE — 99214 OFFICE O/P EST MOD 30 MIN: CPT | Mod: 25

## 2021-06-15 PROCEDURE — 93000 ELECTROCARDIOGRAM COMPLETE: CPT | Mod: 59

## 2021-06-15 PROCEDURE — 36415 COLL VENOUS BLD VENIPUNCTURE: CPT

## 2021-06-15 NOTE — HISTORY OF PRESENT ILLNESS
[FreeTextEntry1] : ROUTINE FOLLOW UP  [de-identified] : 1. S/P MVR : FEELS WELL , NO DYSPNEA , COUGH , EDEMA \par 2. ATRIAL FIBRILLATION : ON ELIQUIS \par 3. CHF : RESOLVED , 2/2 TO MR \par 4. PPM PLACEMENT

## 2021-06-15 NOTE — PHYSICAL EXAM
[No Acute Distress] : no acute distress [No Lymphadenopathy] : no lymphadenopathy [Regular Rhythm] : with a regular rhythm [Normal S1, S2] : normal S1 and S2 [No Murmur] : no murmur heard [No Carotid Bruits] : no carotid bruits [No Edema] : there was no peripheral edema [Normal] : soft, non-tender, non-distended, no masses palpated, no HSM and normal bowel sounds [de-identified] : DECREASED BS RIGHT BASE , NO RUBS  [de-identified] : THORACOTOMY SCAR HEALING WELL

## 2021-06-16 LAB
ALBUMIN SERPL ELPH-MCNC: 4 G/DL
ALP BLD-CCNC: 181 U/L
ALT SERPL-CCNC: 16 U/L
ANION GAP SERPL CALC-SCNC: 11 MMOL/L
AST SERPL-CCNC: 22 U/L
BASOPHILS # BLD AUTO: 0.03 K/UL
BASOPHILS NFR BLD AUTO: 0.7 %
BILIRUB SERPL-MCNC: 0.7 MG/DL
BUN SERPL-MCNC: 16 MG/DL
CALCIUM SERPL-MCNC: 9.6 MG/DL
CHLORIDE SERPL-SCNC: 100 MMOL/L
CO2 SERPL-SCNC: 28 MMOL/L
CREAT SERPL-MCNC: 1.01 MG/DL
EOSINOPHIL # BLD AUTO: 0.2 K/UL
EOSINOPHIL NFR BLD AUTO: 4.4 %
GLUCOSE SERPL-MCNC: 82 MG/DL
HCT VFR BLD CALC: 36.1 %
HGB BLD-MCNC: 11.2 G/DL
IMM GRANULOCYTES NFR BLD AUTO: 0.4 %
LYMPHOCYTES # BLD AUTO: 0.87 K/UL
LYMPHOCYTES NFR BLD AUTO: 19.2 %
MAN DIFF?: NORMAL
MCHC RBC-ENTMCNC: 30.2 PG
MCHC RBC-ENTMCNC: 31 GM/DL
MCV RBC AUTO: 97.3 FL
MONOCYTES # BLD AUTO: 0.42 K/UL
MONOCYTES NFR BLD AUTO: 9.3 %
NEUTROPHILS # BLD AUTO: 2.98 K/UL
NEUTROPHILS NFR BLD AUTO: 66 %
PLATELET # BLD AUTO: 187 K/UL
POTASSIUM SERPL-SCNC: 4.2 MMOL/L
PROT SERPL-MCNC: 6.2 G/DL
RBC # BLD: 3.71 M/UL
RBC # FLD: 15.1 %
SODIUM SERPL-SCNC: 140 MMOL/L
WBC # FLD AUTO: 4.52 K/UL

## 2021-07-21 RX ORDER — APIXABAN 5 MG/1
5 TABLET, FILM COATED ORAL
Qty: 60 | Refills: 2 | Status: ACTIVE | COMMUNITY
Start: 1900-01-01 | End: 1900-01-01

## 2021-07-22 ENCOUNTER — RX RENEWAL (OUTPATIENT)
Age: 81
End: 2021-07-22

## 2021-08-06 ENCOUNTER — NON-APPOINTMENT (OUTPATIENT)
Age: 81
End: 2021-08-06

## 2021-08-06 ENCOUNTER — LABORATORY RESULT (OUTPATIENT)
Age: 81
End: 2021-08-06

## 2021-08-06 ENCOUNTER — APPOINTMENT (OUTPATIENT)
Dept: INTERNAL MEDICINE | Facility: CLINIC | Age: 81
End: 2021-08-06
Payer: MEDICARE

## 2021-08-06 VITALS
WEIGHT: 165 LBS | RESPIRATION RATE: 12 BRPM | BODY MASS INDEX: 31.15 KG/M2 | OXYGEN SATURATION: 98 % | DIASTOLIC BLOOD PRESSURE: 67 MMHG | HEART RATE: 62 BPM | HEIGHT: 61 IN | SYSTOLIC BLOOD PRESSURE: 113 MMHG

## 2021-08-06 VITALS — SYSTOLIC BLOOD PRESSURE: 100 MMHG | DIASTOLIC BLOOD PRESSURE: 60 MMHG

## 2021-08-06 DIAGNOSIS — I34.0 NONRHEUMATIC MITRAL (VALVE) INSUFFICIENCY: ICD-10-CM

## 2021-08-06 PROCEDURE — 93000 ELECTROCARDIOGRAM COMPLETE: CPT

## 2021-08-06 PROCEDURE — 36415 COLL VENOUS BLD VENIPUNCTURE: CPT

## 2021-08-06 PROCEDURE — 99214 OFFICE O/P EST MOD 30 MIN: CPT | Mod: 25

## 2021-08-06 NOTE — PHYSICAL EXAM
[No Acute Distress] : no acute distress [No Lymphadenopathy] : no lymphadenopathy [No Carotid Bruits] : no carotid bruits [No Edema] : there was no peripheral edema [Normal] : soft, non-tender, non-distended, no masses palpated, no HSM and normal bowel sounds

## 2021-08-06 NOTE — REVIEW OF SYSTEMS
[Negative] : Genitourinary [Chest Pain] : no chest pain [Palpitations] : no palpitations [Orthopena] : no orthopnea [Paroxysmal Nocturnal Dyspnea] : no paroxysmal nocturnal dyspnea

## 2021-08-06 NOTE — HISTORY OF PRESENT ILLNESS
[FreeTextEntry1] : routine follow up  [de-identified] : 1. S/P MVR : FEELS WELL , NO DYSPNEA , CHEST PAINS \par 2. A FIB \par 3. S/P PPM PLACEMENT \par 4 . CHF

## 2021-08-07 LAB
ALBUMIN SERPL ELPH-MCNC: 4.2 G/DL
ALP BLD-CCNC: 173 U/L
ALT SERPL-CCNC: 26 U/L
ANION GAP SERPL CALC-SCNC: 13 MMOL/L
AST SERPL-CCNC: 28 U/L
BILIRUB SERPL-MCNC: 0.7 MG/DL
BUN SERPL-MCNC: 20 MG/DL
CALCIUM SERPL-MCNC: 9.4 MG/DL
CHLORIDE SERPL-SCNC: 104 MMOL/L
CO2 SERPL-SCNC: 25 MMOL/L
CREAT SERPL-MCNC: 1.05 MG/DL
GLUCOSE SERPL-MCNC: 94 MG/DL
POTASSIUM SERPL-SCNC: 4.3 MMOL/L
PROT SERPL-MCNC: 6.5 G/DL
SODIUM SERPL-SCNC: 142 MMOL/L

## 2021-08-13 DIAGNOSIS — R74.8 ABNORMAL LEVELS OF OTHER SERUM ENZYMES: ICD-10-CM

## 2021-08-20 ENCOUNTER — APPOINTMENT (OUTPATIENT)
Dept: ULTRASOUND IMAGING | Facility: CLINIC | Age: 81
End: 2021-08-20
Payer: MEDICARE

## 2021-08-20 ENCOUNTER — OUTPATIENT (OUTPATIENT)
Dept: OUTPATIENT SERVICES | Facility: HOSPITAL | Age: 81
LOS: 1 days | End: 2021-08-20
Payer: MEDICARE

## 2021-08-20 DIAGNOSIS — Z98.89 OTHER SPECIFIED POSTPROCEDURAL STATES: Chronic | ICD-10-CM

## 2021-08-20 DIAGNOSIS — Z98.890 OTHER SPECIFIED POSTPROCEDURAL STATES: Chronic | ICD-10-CM

## 2021-08-20 DIAGNOSIS — R74.8 ABNORMAL LEVELS OF OTHER SERUM ENZYMES: ICD-10-CM

## 2021-08-20 PROCEDURE — 76705 ECHO EXAM OF ABDOMEN: CPT

## 2021-08-20 PROCEDURE — 76705 ECHO EXAM OF ABDOMEN: CPT | Mod: 26

## 2021-09-22 ENCOUNTER — APPOINTMENT (OUTPATIENT)
Dept: INTERNAL MEDICINE | Facility: CLINIC | Age: 81
End: 2021-09-22
Payer: MEDICARE

## 2021-09-22 VITALS
DIASTOLIC BLOOD PRESSURE: 65 MMHG | WEIGHT: 168 LBS | BODY MASS INDEX: 31.74 KG/M2 | OXYGEN SATURATION: 96 % | HEART RATE: 59 BPM | SYSTOLIC BLOOD PRESSURE: 109 MMHG

## 2021-09-22 DIAGNOSIS — J90 PLEURAL EFFUSION, NOT ELSEWHERE CLASSIFIED: ICD-10-CM

## 2021-09-22 PROCEDURE — G0008: CPT

## 2021-09-22 PROCEDURE — 90662 IIV NO PRSV INCREASED AG IM: CPT

## 2021-09-22 PROCEDURE — 99213 OFFICE O/P EST LOW 20 MIN: CPT | Mod: 25

## 2021-09-22 RX ORDER — POTASSIUM CHLORIDE 1500 MG/1
20 TABLET, EXTENDED RELEASE ORAL DAILY
Qty: 90 | Refills: 1 | Status: DISCONTINUED | COMMUNITY
Start: 2021-05-17 | End: 2021-09-22

## 2021-09-22 RX ORDER — FUROSEMIDE 40 MG/1
40 TABLET ORAL DAILY
Qty: 90 | Refills: 0 | Status: DISCONTINUED | COMMUNITY
Start: 2021-03-11 | End: 2021-09-22

## 2021-09-22 NOTE — HISTORY OF PRESENT ILLNESS
[FreeTextEntry1] : routine follow up  [de-identified] : 1. S/P MVR \par 2. S/P A FIB \par 3. S/P PPM PLACEMENT \par 4 CHF : RESOLVED POST VALVE REPLACEMENT\par FEELS WELL , NO DYSPNEA , EDEMA

## 2021-09-22 NOTE — PHYSICAL EXAM
[Regular Rhythm] : with a regular rhythm [Normal S1, S2] : normal S1 and S2 [No Murmur] : no murmur heard [No Edema] : there was no peripheral edema [Normal] : soft, non-tender, non-distended, no masses palpated, no HSM and normal bowel sounds

## 2021-12-20 ENCOUNTER — NON-APPOINTMENT (OUTPATIENT)
Age: 81
End: 2021-12-20

## 2021-12-20 ENCOUNTER — APPOINTMENT (OUTPATIENT)
Dept: INTERNAL MEDICINE | Facility: CLINIC | Age: 81
End: 2021-12-20
Payer: MEDICARE

## 2021-12-20 VITALS
WEIGHT: 181.56 LBS | RESPIRATION RATE: 12 BRPM | HEIGHT: 71 IN | HEART RATE: 58 BPM | DIASTOLIC BLOOD PRESSURE: 79 MMHG | OXYGEN SATURATION: 97 % | BODY MASS INDEX: 25.42 KG/M2 | SYSTOLIC BLOOD PRESSURE: 130 MMHG

## 2021-12-20 VITALS — DIASTOLIC BLOOD PRESSURE: 78 MMHG | SYSTOLIC BLOOD PRESSURE: 120 MMHG

## 2021-12-20 PROCEDURE — 99214 OFFICE O/P EST MOD 30 MIN: CPT | Mod: 25

## 2021-12-20 PROCEDURE — 36415 COLL VENOUS BLD VENIPUNCTURE: CPT

## 2021-12-20 NOTE — PHYSICAL EXAM
[No Acute Distress] : no acute distress [Normal Sclera/Conjunctiva] : normal sclera/conjunctiva [No Lymphadenopathy] : no lymphadenopathy [No Carotid Bruits] : no carotid bruits [No Edema] : there was no peripheral edema [Normal] : no posterior cervical lymphadenopathy and no anterior cervical lymphadenopathy

## 2021-12-20 NOTE — HISTORY OF PRESENT ILLNESS
[FreeTextEntry1] : ROUTINE FOLLOW UP  [de-identified] : 1. S/P MVR : FEELS WELL , DENIES ANY DYSPNEA , WALKING \par W/O SOB \par 2. S/P A FIB \par 3. S/P PPM PLACEMENT \par 4. HLD \par 5. CHF : RESOLVED

## 2021-12-21 LAB
ALBUMIN SERPL ELPH-MCNC: 4.2 G/DL
ALP BLD-CCNC: 133 U/L
ALT SERPL-CCNC: 20 U/L
ANION GAP SERPL CALC-SCNC: 12 MMOL/L
AST SERPL-CCNC: 27 U/L
BILIRUB SERPL-MCNC: 0.9 MG/DL
BUN SERPL-MCNC: 18 MG/DL
CALCIUM SERPL-MCNC: 9.4 MG/DL
CHLORIDE SERPL-SCNC: 104 MMOL/L
CHOLEST SERPL-MCNC: 219 MG/DL
CO2 SERPL-SCNC: 26 MMOL/L
CREAT SERPL-MCNC: 1.22 MG/DL
GLUCOSE SERPL-MCNC: 104 MG/DL
HDLC SERPL-MCNC: 59 MG/DL
LDLC SERPL CALC-MCNC: 144 MG/DL
NONHDLC SERPL-MCNC: 160 MG/DL
POTASSIUM SERPL-SCNC: 3.6 MMOL/L
PROT SERPL-MCNC: 6.5 G/DL
SODIUM SERPL-SCNC: 142 MMOL/L
TRIGL SERPL-MCNC: 79 MG/DL

## 2022-02-16 NOTE — ASSESSMENT
[FreeTextEntry1] : -STABLE ON PRESENT MEDS \par -CHECK [facility] LABS  patient

## 2022-03-22 ENCOUNTER — NON-APPOINTMENT (OUTPATIENT)
Age: 82
End: 2022-03-22

## 2022-03-22 ENCOUNTER — APPOINTMENT (OUTPATIENT)
Dept: INTERNAL MEDICINE | Facility: CLINIC | Age: 82
End: 2022-03-22
Payer: MEDICARE

## 2022-03-22 VITALS
OXYGEN SATURATION: 97 % | WEIGHT: 179 LBS | HEART RATE: 60 BPM | SYSTOLIC BLOOD PRESSURE: 123 MMHG | HEIGHT: 71 IN | DIASTOLIC BLOOD PRESSURE: 74 MMHG | BODY MASS INDEX: 25.06 KG/M2 | RESPIRATION RATE: 12 BRPM

## 2022-03-22 VITALS — SYSTOLIC BLOOD PRESSURE: 110 MMHG | DIASTOLIC BLOOD PRESSURE: 70 MMHG

## 2022-03-22 DIAGNOSIS — F41.9 ANXIETY DISORDER, UNSPECIFIED: ICD-10-CM

## 2022-03-22 PROCEDURE — 99214 OFFICE O/P EST MOD 30 MIN: CPT | Mod: 25

## 2022-03-22 PROCEDURE — 93000 ELECTROCARDIOGRAM COMPLETE: CPT | Mod: 59

## 2022-03-22 PROCEDURE — 36415 COLL VENOUS BLD VENIPUNCTURE: CPT

## 2022-03-22 RX ORDER — ASPIRIN 81 MG/1
81 TABLET ORAL
Refills: 0 | Status: DISCONTINUED | COMMUNITY
End: 2022-03-22

## 2022-03-22 RX ORDER — METOPROLOL SUCCINATE 50 MG/1
50 TABLET, EXTENDED RELEASE ORAL
Qty: 90 | Refills: 1 | Status: ACTIVE | COMMUNITY
Start: 2022-03-22

## 2022-03-22 RX ORDER — ATORVASTATIN CALCIUM 20 MG/1
20 TABLET, FILM COATED ORAL
Qty: 90 | Refills: 0 | Status: DISCONTINUED | COMMUNITY
Start: 2021-03-11 | End: 2022-03-22

## 2022-03-22 RX ORDER — METOPROLOL TARTRATE 25 MG/1
25 TABLET, FILM COATED ORAL
Qty: 60 | Refills: 3 | Status: DISCONTINUED | COMMUNITY
Start: 2021-05-21 | End: 2022-03-22

## 2022-03-22 RX ORDER — METOPROLOL TARTRATE 50 MG/1
50 TABLET, FILM COATED ORAL
Qty: 180 | Refills: 0 | Status: DISCONTINUED | COMMUNITY
Start: 2021-03-11 | End: 2022-03-22

## 2022-03-23 LAB
ALBUMIN SERPL ELPH-MCNC: 4.2 G/DL
ALP BLD-CCNC: 130 U/L
ALT SERPL-CCNC: 22 U/L
ANION GAP SERPL CALC-SCNC: 12 MMOL/L
AST SERPL-CCNC: 27 U/L
BASOPHILS # BLD AUTO: 0.01 K/UL
BASOPHILS NFR BLD AUTO: 0.3 %
BILIRUB SERPL-MCNC: 0.9 MG/DL
BUN SERPL-MCNC: 17 MG/DL
CALCIUM SERPL-MCNC: 9.4 MG/DL
CHLORIDE SERPL-SCNC: 103 MMOL/L
CHOLEST SERPL-MCNC: 164 MG/DL
CO2 SERPL-SCNC: 26 MMOL/L
CREAT SERPL-MCNC: 1.13 MG/DL
EGFR: 65 ML/MIN/1.73M2
EOSINOPHIL # BLD AUTO: 0.09 K/UL
EOSINOPHIL NFR BLD AUTO: 2.5 %
GLUCOSE SERPL-MCNC: 111 MG/DL
HCT VFR BLD CALC: 44.3 %
HDLC SERPL-MCNC: 45 MG/DL
HGB BLD-MCNC: 13.9 G/DL
IMM GRANULOCYTES NFR BLD AUTO: 0.3 %
LDLC SERPL CALC-MCNC: 96 MG/DL
LYMPHOCYTES # BLD AUTO: 0.87 K/UL
LYMPHOCYTES NFR BLD AUTO: 24 %
MAN DIFF?: NORMAL
MCHC RBC-ENTMCNC: 29.4 PG
MCHC RBC-ENTMCNC: 31.4 GM/DL
MCV RBC AUTO: 93.7 FL
MONOCYTES # BLD AUTO: 0.34 K/UL
MONOCYTES NFR BLD AUTO: 9.4 %
NEUTROPHILS # BLD AUTO: 2.3 K/UL
NEUTROPHILS NFR BLD AUTO: 63.5 %
NONHDLC SERPL-MCNC: 118 MG/DL
PLATELET # BLD AUTO: 112 K/UL
POTASSIUM SERPL-SCNC: 4.4 MMOL/L
PROT SERPL-MCNC: 6.4 G/DL
RBC # BLD: 4.73 M/UL
RBC # FLD: 13.5 %
SODIUM SERPL-SCNC: 141 MMOL/L
TRIGL SERPL-MCNC: 113 MG/DL
WBC # FLD AUTO: 3.62 K/UL

## 2022-04-20 ENCOUNTER — RX RENEWAL (OUTPATIENT)
Age: 82
End: 2022-04-20

## 2022-06-20 ENCOUNTER — APPOINTMENT (OUTPATIENT)
Dept: INTERNAL MEDICINE | Facility: CLINIC | Age: 82
End: 2022-06-20

## 2022-07-14 ENCOUNTER — APPOINTMENT (OUTPATIENT)
Dept: INTERNAL MEDICINE | Facility: CLINIC | Age: 82
End: 2022-07-14

## 2022-07-14 VITALS
HEIGHT: 71 IN | WEIGHT: 179 LBS | BODY MASS INDEX: 25.06 KG/M2 | SYSTOLIC BLOOD PRESSURE: 126 MMHG | DIASTOLIC BLOOD PRESSURE: 79 MMHG | HEART RATE: 59 BPM | OXYGEN SATURATION: 95 %

## 2022-07-14 VITALS — DIASTOLIC BLOOD PRESSURE: 60 MMHG | SYSTOLIC BLOOD PRESSURE: 120 MMHG

## 2022-07-14 DIAGNOSIS — C61 MALIGNANT NEOPLASM OF PROSTATE: ICD-10-CM

## 2022-07-14 PROCEDURE — 36415 COLL VENOUS BLD VENIPUNCTURE: CPT

## 2022-07-14 PROCEDURE — G0439: CPT

## 2022-07-14 PROCEDURE — G0444 DEPRESSION SCREEN ANNUAL: CPT | Mod: 59

## 2022-07-14 PROCEDURE — 93000 ELECTROCARDIOGRAM COMPLETE: CPT | Mod: 59

## 2022-07-14 PROCEDURE — 99213 OFFICE O/P EST LOW 20 MIN: CPT | Mod: 25

## 2022-07-14 NOTE — PHYSICAL EXAM
[No Acute Distress] : no acute distress [Normal Sclera/Conjunctiva] : normal sclera/conjunctiva [EOMI] : extraocular movements intact [Normal Oropharynx] : the oropharynx was normal [Normal TMs] : both tympanic membranes were normal [No Lymphadenopathy] : no lymphadenopathy [Thyroid Normal, No Nodules] : the thyroid was normal and there were no nodules present [No Carotid Bruits] : no carotid bruits [No Abdominal Bruit] : a ~M bruit was not heard ~T in the abdomen [No Varicosities] : no varicosities [No Edema] : there was no peripheral edema [No Palpable Aorta] : no palpable aorta [Normal Appearance] : normal in appearance [No Masses] : no palpable masses [Declined Rectal Exam] : declined rectal exam [Normal] : no posterior cervical lymphadenopathy and no anterior cervical lymphadenopathy [No CVA Tenderness] : no CVA  tenderness [No Spinal Tenderness] : no spinal tenderness [Coordination Grossly Intact] : coordination grossly intact [No Focal Deficits] : no focal deficits [Normal Affect] : the affect was normal [Normal Insight/Judgement] : insight and judgment were intact [FreeTextEntry1] : By urology [de-identified] : No suspicious skin lesion

## 2022-07-14 NOTE — ASSESSMENT
[FreeTextEntry1] : Stable\par Continue present medication\par Check routine blood work, PSA in office

## 2022-07-14 NOTE — HISTORY OF PRESENT ILLNESS
[de-identified] : Patient with history mitral valve replacement, paroxysmal atrial fibrillation, hyperlipidemia, hypertension and status post permanent pacemaker placement returns to office for annual wellness exam.  He recently returned from a trip to [place] and denies any chest pain, shortness of breath or palpitation.  He states compliance with all his medications.

## 2022-07-14 NOTE — HEALTH RISK ASSESSMENT
[0] : 2) Feeling down, depressed, or hopeless: Not at all (0) [PHQ-2 Negative - No further assessment needed] : PHQ-2 Negative - No further assessment needed [[id number]] : 0 [ColonoscopyComments] : No further testing needed

## 2022-07-15 LAB
ALBUMIN SERPL ELPH-MCNC: 4 G/DL
ALP BLD-CCNC: 131 U/L
ALT SERPL-CCNC: 18 U/L
ANION GAP SERPL CALC-SCNC: 11 MMOL/L
APPEARANCE: CLEAR
AST SERPL-CCNC: 25 U/L
BACTERIA: NEGATIVE
BASOPHILS # BLD AUTO: 0.02 K/UL
BASOPHILS NFR BLD AUTO: 0.5 %
BILIRUB SERPL-MCNC: 0.8 MG/DL
BILIRUBIN URINE: NEGATIVE
BLOOD URINE: NEGATIVE
BUN SERPL-MCNC: 19 MG/DL
CALCIUM SERPL-MCNC: 9.4 MG/DL
CHLORIDE SERPL-SCNC: 104 MMOL/L
CHOLEST SERPL-MCNC: 182 MG/DL
CO2 SERPL-SCNC: 27 MMOL/L
COLOR: YELLOW
CREAT SERPL-MCNC: 1.17 MG/DL
EGFR: 63 ML/MIN/1.73M2
EOSINOPHIL # BLD AUTO: 0.08 K/UL
EOSINOPHIL NFR BLD AUTO: 2 %
GLUCOSE QUALITATIVE U: NEGATIVE
GLUCOSE SERPL-MCNC: 113 MG/DL
HCT VFR BLD CALC: 42.3 %
HDLC SERPL-MCNC: 47 MG/DL
HGB BLD-MCNC: 14 G/DL
HYALINE CASTS: 1 /LPF
IMM GRANULOCYTES NFR BLD AUTO: 0.3 %
KETONES URINE: NEGATIVE
LDLC SERPL CALC-MCNC: 115 MG/DL
LEUKOCYTE ESTERASE URINE: NEGATIVE
LYMPHOCYTES # BLD AUTO: 1 K/UL
LYMPHOCYTES NFR BLD AUTO: 25.3 %
MAN DIFF?: NORMAL
MCHC RBC-ENTMCNC: 30.8 PG
MCHC RBC-ENTMCNC: 33.1 GM/DL
MCV RBC AUTO: 93.2 FL
MICROSCOPIC-UA: NORMAL
MONOCYTES # BLD AUTO: 0.3 K/UL
MONOCYTES NFR BLD AUTO: 7.6 %
NEUTROPHILS # BLD AUTO: 2.54 K/UL
NEUTROPHILS NFR BLD AUTO: 64.3 %
NITRITE URINE: NEGATIVE
NONHDLC SERPL-MCNC: 135 MG/DL
PH URINE: 6.5
PLATELET # BLD AUTO: 122 K/UL
POTASSIUM SERPL-SCNC: 4.4 MMOL/L
PROT SERPL-MCNC: 6.5 G/DL
PROTEIN URINE: NORMAL
PSA SERPL-MCNC: 1.59 NG/ML
RBC # BLD: 4.54 M/UL
RBC # FLD: 13.8 %
RED BLOOD CELLS URINE: 4 /HPF
SODIUM SERPL-SCNC: 142 MMOL/L
SPECIFIC GRAVITY URINE: 1.02
SQUAMOUS EPITHELIAL CELLS: 0 /HPF
TRIGL SERPL-MCNC: 101 MG/DL
UROBILINOGEN URINE: NORMAL
WBC # FLD AUTO: 3.95 K/UL
WHITE BLOOD CELLS URINE: 0 /HPF

## 2022-07-16 ENCOUNTER — LABORATORY RESULT (OUTPATIENT)
Age: 82
End: 2022-07-16

## 2022-10-05 ENCOUNTER — APPOINTMENT (OUTPATIENT)
Dept: INTERNAL MEDICINE | Facility: CLINIC | Age: 82
End: 2022-10-05

## 2022-10-05 VITALS
SYSTOLIC BLOOD PRESSURE: 124 MMHG | BODY MASS INDEX: 25.11 KG/M2 | HEART RATE: 69 BPM | WEIGHT: 180 LBS | OXYGEN SATURATION: 93 % | DIASTOLIC BLOOD PRESSURE: 81 MMHG

## 2022-10-05 DIAGNOSIS — Z23 ENCOUNTER FOR IMMUNIZATION: ICD-10-CM

## 2022-10-05 PROCEDURE — 36415 COLL VENOUS BLD VENIPUNCTURE: CPT

## 2022-10-05 PROCEDURE — G0008: CPT

## 2022-10-05 PROCEDURE — 90662 IIV NO PRSV INCREASED AG IM: CPT

## 2022-10-05 PROCEDURE — 99214 OFFICE O/P EST MOD 30 MIN: CPT | Mod: 25

## 2022-10-05 NOTE — ASSESSMENT
[FreeTextEntry1] : Stable\par Continue present medication\par Routine blood work neck\par Flu vaccination today

## 2022-10-05 NOTE — HISTORY OF PRESENT ILLNESS
[FreeTextEntry1] : Routine follow-up [de-identified] : Patient with history of hyperlipidemia, status post mitral valve replacement, status post permanent pacemaker placement returns to office for routine follow up.\par He feels well and denies any chest pain, shortness of breath or palpitations.\par States compliance with all his medication.\par

## 2022-10-05 NOTE — PHYSICAL EXAM
[No Acute Distress] : no acute distress [Normal Sclera/Conjunctiva] : normal sclera/conjunctiva [No Lymphadenopathy] : no lymphadenopathy [No Abdominal Bruit] : a ~M bruit was not heard ~T in the abdomen [No Edema] : there was no peripheral edema [Normal] : soft, non-tender, non-distended, no masses palpated, no HSM and normal bowel sounds [Normal Supraclavicular Nodes] : no supraclavicular lymphadenopathy [Normal Posterior Cervical Nodes] : no posterior cervical lymphadenopathy [Normal Anterior Cervical Nodes] : no anterior cervical lymphadenopathy [No CVA Tenderness] : no CVA  tenderness

## 2023-01-09 ENCOUNTER — NON-APPOINTMENT (OUTPATIENT)
Age: 83
End: 2023-01-09

## 2023-01-09 ENCOUNTER — APPOINTMENT (OUTPATIENT)
Dept: INTERNAL MEDICINE | Facility: CLINIC | Age: 83
End: 2023-01-09
Payer: MEDICARE

## 2023-01-09 VITALS
SYSTOLIC BLOOD PRESSURE: 121 MMHG | WEIGHT: 186 LBS | OXYGEN SATURATION: 95 % | HEART RATE: 60 BPM | DIASTOLIC BLOOD PRESSURE: 81 MMHG | BODY MASS INDEX: 25.94 KG/M2

## 2023-01-09 PROCEDURE — 99214 OFFICE O/P EST MOD 30 MIN: CPT | Mod: 25

## 2023-01-09 PROCEDURE — 93000 ELECTROCARDIOGRAM COMPLETE: CPT | Mod: 59

## 2023-01-09 PROCEDURE — 36415 COLL VENOUS BLD VENIPUNCTURE: CPT

## 2023-01-10 LAB
ALBUMIN SERPL ELPH-MCNC: 4 G/DL
ALP BLD-CCNC: 164 U/L
ALT SERPL-CCNC: 25 U/L
ANION GAP SERPL CALC-SCNC: 10 MMOL/L
AST SERPL-CCNC: 36 U/L
BASOPHILS # BLD AUTO: 0.02 K/UL
BASOPHILS NFR BLD AUTO: 0.6 %
BILIRUB SERPL-MCNC: 0.5 MG/DL
BUN SERPL-MCNC: 23 MG/DL
CALCIUM SERPL-MCNC: 9.4 MG/DL
CHLORIDE SERPL-SCNC: 105 MMOL/L
CO2 SERPL-SCNC: 27 MMOL/L
CREAT SERPL-MCNC: 1.14 MG/DL
EGFR: 64 ML/MIN/1.73M2
EOSINOPHIL # BLD AUTO: 0.14 K/UL
EOSINOPHIL NFR BLD AUTO: 3.9 %
GLUCOSE SERPL-MCNC: 104 MG/DL
HCT VFR BLD CALC: 40.8 %
HGB BLD-MCNC: 13.3 G/DL
IMM GRANULOCYTES NFR BLD AUTO: 0.3 %
LYMPHOCYTES # BLD AUTO: 1.04 K/UL
LYMPHOCYTES NFR BLD AUTO: 29.3 %
MAN DIFF?: NORMAL
MCHC RBC-ENTMCNC: 30.2 PG
MCHC RBC-ENTMCNC: 32.6 GM/DL
MCV RBC AUTO: 92.7 FL
MONOCYTES # BLD AUTO: 0.33 K/UL
MONOCYTES NFR BLD AUTO: 9.3 %
NEUTROPHILS # BLD AUTO: 2.01 K/UL
NEUTROPHILS NFR BLD AUTO: 56.6 %
PLATELET # BLD AUTO: 103 K/UL
POTASSIUM SERPL-SCNC: 3.9 MMOL/L
PROT SERPL-MCNC: 6.3 G/DL
RBC # BLD: 4.4 M/UL
RBC # FLD: 13.5 %
SODIUM SERPL-SCNC: 142 MMOL/L
WBC # FLD AUTO: 3.55 K/UL

## 2023-01-18 ENCOUNTER — RX RENEWAL (OUTPATIENT)
Age: 83
End: 2023-01-18

## 2023-01-18 RX ORDER — FOLIC ACID 1 MG/1
1 TABLET ORAL
Qty: 90 | Refills: 2 | Status: ACTIVE | COMMUNITY
Start: 2022-04-20 | End: 1900-01-01

## 2023-04-26 ENCOUNTER — APPOINTMENT (OUTPATIENT)
Dept: INTERNAL MEDICINE | Facility: CLINIC | Age: 83
End: 2023-04-26
Payer: MEDICARE

## 2023-04-26 VITALS
SYSTOLIC BLOOD PRESSURE: 124 MMHG | HEART RATE: 59 BPM | DIASTOLIC BLOOD PRESSURE: 75 MMHG | RESPIRATION RATE: 12 BRPM | OXYGEN SATURATION: 95 % | HEIGHT: 71 IN | BODY MASS INDEX: 25.9 KG/M2 | WEIGHT: 185 LBS

## 2023-04-26 PROCEDURE — 99213 OFFICE O/P EST LOW 20 MIN: CPT

## 2023-06-29 DIAGNOSIS — U07.1 COVID-19: ICD-10-CM

## 2023-07-17 ENCOUNTER — NON-APPOINTMENT (OUTPATIENT)
Age: 83
End: 2023-07-17

## 2023-07-17 ENCOUNTER — APPOINTMENT (OUTPATIENT)
Dept: INTERNAL MEDICINE | Facility: CLINIC | Age: 83
End: 2023-07-17
Payer: MEDICARE

## 2023-07-17 VITALS — DIASTOLIC BLOOD PRESSURE: 80 MMHG | SYSTOLIC BLOOD PRESSURE: 120 MMHG

## 2023-07-17 VITALS
HEIGHT: 71 IN | TEMPERATURE: 97.7 F | HEART RATE: 60 BPM | OXYGEN SATURATION: 100 % | BODY MASS INDEX: 24.64 KG/M2 | SYSTOLIC BLOOD PRESSURE: 125 MMHG | RESPIRATION RATE: 12 BRPM | DIASTOLIC BLOOD PRESSURE: 78 MMHG | WEIGHT: 176 LBS

## 2023-07-17 DIAGNOSIS — Z95.0 PRESENCE OF CARDIAC PACEMAKER: ICD-10-CM

## 2023-07-17 DIAGNOSIS — Z00.00 ENCOUNTER FOR GENERAL ADULT MEDICAL EXAMINATION W/OUT ABNORMAL FINDINGS: ICD-10-CM

## 2023-07-17 DIAGNOSIS — I50.9 HEART FAILURE, UNSPECIFIED: ICD-10-CM

## 2023-07-17 PROCEDURE — 36415 COLL VENOUS BLD VENIPUNCTURE: CPT

## 2023-07-17 PROCEDURE — 99213 OFFICE O/P EST LOW 20 MIN: CPT | Mod: 25

## 2023-07-17 PROCEDURE — G0444 DEPRESSION SCREEN ANNUAL: CPT | Mod: 59

## 2023-07-17 PROCEDURE — 93000 ELECTROCARDIOGRAM COMPLETE: CPT | Mod: 59

## 2023-07-17 PROCEDURE — G0439: CPT

## 2023-07-17 RX ORDER — MOLNUPIRAVIR 200 MG/1
200 CAPSULE ORAL
Qty: 40 | Refills: 0 | Status: DISCONTINUED | COMMUNITY
Start: 2023-06-29 | End: 2023-07-17

## 2023-07-17 NOTE — PHYSICAL EXAM
[No Acute Distress] : no acute distress [Normal Sclera/Conjunctiva] : normal sclera/conjunctiva [PERRL] : pupils equal round and reactive to light [EOMI] : extraocular movements intact [Normal Outer Ear/Nose] : the outer ears and nose were normal in appearance [Normal Oropharynx] : the oropharynx was normal [Normal TMs] : both tympanic membranes were normal [No Lymphadenopathy] : no lymphadenopathy [Thyroid Normal, No Nodules] : the thyroid was normal and there were no nodules present [No Carotid Bruits] : no carotid bruits [No Abdominal Bruit] : a ~M bruit was not heard ~T in the abdomen [No Varicosities] : no varicosities [No Edema] : there was no peripheral edema [No Palpable Aorta] : no palpable aorta [Normal Appearance] : normal in appearance [No Masses] : no palpable masses [Declined Rectal Exam] : declined rectal exam [Normal] : no posterior cervical lymphadenopathy and no anterior cervical lymphadenopathy [No CVA Tenderness] : no CVA  tenderness [No Spinal Tenderness] : no spinal tenderness [No Joint Swelling] : no joint swelling [No Rash] : no rash [Coordination Grossly Intact] : coordination grossly intact [No Focal Deficits] : no focal deficits [Normal Gait] : normal gait [Normal Affect] : the affect was normal [Normal Insight/Judgement] : insight and judgment were intact [FreeTextEntry1] : By urology [de-identified] : Alert and oriented to place, day, month, president and able to count backwards by 3

## 2023-07-17 NOTE — HISTORY OF PRESENT ILLNESS
[Spouse] : spouse [de-identified] : Patient with history of hyperlipidemia, status post mitral valve replacement, history of permanent pacemaker placement, history of previous atrial fibrillation and ITP presents to office for annual wellness exam\par He feels well and denies any chest pain, shortness of breath, palpitations, rectal bleeding or melenic stools.\par He contracted COVID-19 this year and recovered without any antiviral therapy\par His wife states that he becomes occasionally forgetful but is continues to keep track of the family finances

## 2023-07-17 NOTE — ASSESSMENT
[FreeTextEntry1] : Stable with no gross cognitive impairment\par Continue present medication\par Advise regular aerobic activity and heart healthy\par Routine blood work

## 2023-07-17 NOTE — HEALTH RISK ASSESSMENT
[0] : 2) Feeling down, depressed, or hopeless: Not at all (0) [PHQ-2 Negative - No further assessment needed] : PHQ-2 Negative - No further assessment needed [Patient reported colonoscopy was normal] : Patient reported colonoscopy was normal [[id number]] : 0 [ColonoscopyComments] : Greater than 5 years ago no further need

## 2023-07-18 LAB
25(OH)D3 SERPL-MCNC: 64.3 NG/ML
ALBUMIN SERPL ELPH-MCNC: 4.5 G/DL
ALP BLD-CCNC: 153 U/L
ALT SERPL-CCNC: 17 U/L
ANION GAP SERPL CALC-SCNC: 18 MMOL/L
APPEARANCE: CLEAR
AST SERPL-CCNC: 25 U/L
BACTERIA: NEGATIVE /HPF
BILIRUB SERPL-MCNC: 1 MG/DL
BILIRUBIN URINE: NEGATIVE
BLOOD URINE: NEGATIVE
BUN SERPL-MCNC: 21 MG/DL
CALCIUM SERPL-MCNC: 10 MG/DL
CAST: 1 /LPF
CHLORIDE SERPL-SCNC: 102 MMOL/L
CHOLEST SERPL-MCNC: 218 MG/DL
CO2 SERPL-SCNC: 22 MMOL/L
COLOR: YELLOW
CREAT SERPL-MCNC: 1.14 MG/DL
EGFR: 64 ML/MIN/1.73M2
EPITHELIAL CELLS: 0 /HPF
FOLATE SERPL-MCNC: >20 NG/ML
GLUCOSE QUALITATIVE U: NEGATIVE MG/DL
GLUCOSE SERPL-MCNC: 103 MG/DL
HDLC SERPL-MCNC: 52 MG/DL
KETONES URINE: NEGATIVE MG/DL
LDLC SERPL CALC-MCNC: 143 MG/DL
LEUKOCYTE ESTERASE URINE: NEGATIVE
MICROSCOPIC-UA: NORMAL
NITRITE URINE: NEGATIVE
NONHDLC SERPL-MCNC: 166 MG/DL
PH URINE: 5.5
POTASSIUM SERPL-SCNC: 4.4 MMOL/L
PROT SERPL-MCNC: 6.9 G/DL
PROTEIN URINE: NEGATIVE MG/DL
PSA SERPL-MCNC: 1.93 NG/ML
RED BLOOD CELLS URINE: 0 /HPF
SODIUM SERPL-SCNC: 142 MMOL/L
SPECIFIC GRAVITY URINE: 1.01
T3 SERPL-MCNC: 100 NG/DL
T4 FREE SERPL-MCNC: 1.2 NG/DL
TRIGL SERPL-MCNC: 133 MG/DL
TSH SERPL-ACNC: 1.27 UIU/ML
UROBILINOGEN URINE: 0.2 MG/DL
VIT B12 SERPL-MCNC: 504 PG/ML
WHITE BLOOD CELLS URINE: 0 /HPF

## 2023-07-19 ENCOUNTER — LABORATORY RESULT (OUTPATIENT)
Age: 83
End: 2023-07-19

## 2023-10-01 PROBLEM — Z92.3 HISTORY OF RADIATION THERAPY: Status: RESOLVED | Noted: 2020-10-20 | Resolved: 2023-10-01

## 2023-11-13 NOTE — DISCHARGE NOTE PROVIDER - NSDCQMSTAIRS_GEN_ALL_CORE
Pt had a 12 pound weight gain attempted to reach pt the phone number appears to be a nonworking number did send her a [employer] message   Did call her ec numbers left one a vm and the other had the same phone number that is not working No

## 2023-11-23 NOTE — ED ADULT NURSE NOTE - CAS TRG GENERAL AIRWAY, MLM
Flushed dominique with 150ml and pulled back with no resistance. Urine is yellow with sediment, no sign of blood clots. Placed a new dominique bag on the patient and patient expressed relief.       Heaven Mosquera RN  11/23/23 [id number]
Patent

## 2024-05-16 ENCOUNTER — APPOINTMENT (OUTPATIENT)
Dept: INTERNAL MEDICINE | Facility: CLINIC | Age: 84
End: 2024-05-16
Payer: MEDICARE

## 2024-05-16 VITALS
RESPIRATION RATE: 12 BRPM | WEIGHT: 183 LBS | HEART RATE: 60 BPM | SYSTOLIC BLOOD PRESSURE: 138 MMHG | OXYGEN SATURATION: 96 % | DIASTOLIC BLOOD PRESSURE: 84 MMHG | HEIGHT: 71 IN | BODY MASS INDEX: 25.62 KG/M2

## 2024-05-16 VITALS — SYSTOLIC BLOOD PRESSURE: 120 MMHG | DIASTOLIC BLOOD PRESSURE: 84 MMHG

## 2024-05-16 DIAGNOSIS — E78.5 HYPERLIPIDEMIA, UNSPECIFIED: ICD-10-CM

## 2024-05-16 DIAGNOSIS — I50.30 UNSPECIFIED DIASTOLIC (CONGESTIVE) HEART FAILURE: ICD-10-CM

## 2024-05-16 DIAGNOSIS — D69.3 IMMUNE THROMBOCYTOPENIC PURPURA: ICD-10-CM

## 2024-05-16 DIAGNOSIS — Z95.2 PRESENCE OF PROSTHETIC HEART VALVE: ICD-10-CM

## 2024-05-16 DIAGNOSIS — I48.91 UNSPECIFIED ATRIAL FIBRILLATION: ICD-10-CM

## 2024-05-16 PROCEDURE — 36415 COLL VENOUS BLD VENIPUNCTURE: CPT

## 2024-05-16 PROCEDURE — 99214 OFFICE O/P EST MOD 30 MIN: CPT

## 2024-05-16 NOTE — ASSESSMENT
[FreeTextEntry1] : Stable Continue present medication Check routine blood work Advise regular aerobic activity and heart healthy diet

## 2024-05-16 NOTE — PHYSICAL EXAM
[Normal Sclera/Conjunctiva] : normal sclera/conjunctiva [PERRL] : pupils equal round and reactive to light [EOMI] : extraocular movements intact [No Lymphadenopathy] : no lymphadenopathy [Thyroid Normal, No Nodules] : the thyroid was normal and there were no nodules present [No Carotid Bruits] : no carotid bruits [No Abdominal Bruit] : a ~M bruit was not heard ~T in the abdomen [No Varicosities] : no varicosities [No Edema] : there was no peripheral edema [No Palpable Aorta] : no palpable aorta [Normal] : soft, non-tender, non-distended, no masses palpated, no HSM and normal bowel sounds [Normal Supraclavicular Nodes] : no supraclavicular lymphadenopathy [Normal Posterior Cervical Nodes] : no posterior cervical lymphadenopathy [Normal Anterior Cervical Nodes] : no anterior cervical lymphadenopathy [No CVA Tenderness] : no CVA  tenderness

## 2024-05-16 NOTE — HISTORY OF PRESENT ILLNESS
[FreeTextEntry1] : Routine follow up [de-identified] : Patient with history of atrial fibrillation, congestive heart failure, hyperlipidemia, permanent pacemaker placement, heart failure with preserved ejection fraction presents to office for routine follow-up. Overall he feels well and denies any exertional chest pain, shortness of breath, palpitations, and states compliance with all medication. Denies any easy bruising, bleeding or rectal bleeding

## 2024-05-17 LAB
ALBUMIN SERPL ELPH-MCNC: 4.1 G/DL
ALP BLD-CCNC: 127 U/L
ALT SERPL-CCNC: 17 U/L
ANION GAP SERPL CALC-SCNC: 11 MMOL/L
AST SERPL-CCNC: 25 U/L
BASOPHILS # BLD AUTO: 0.02 K/UL
BASOPHILS NFR BLD AUTO: 0.5 %
BILIRUB SERPL-MCNC: 0.8 MG/DL
BUN SERPL-MCNC: 14 MG/DL
CALCIUM SERPL-MCNC: 9.3 MG/DL
CHLORIDE SERPL-SCNC: 104 MMOL/L
CHOLEST SERPL-MCNC: 182 MG/DL
CO2 SERPL-SCNC: 26 MMOL/L
CREAT SERPL-MCNC: 1.21 MG/DL
EGFR: 59 ML/MIN/1.73M2
EOSINOPHIL # BLD AUTO: 0.11 K/UL
EOSINOPHIL NFR BLD AUTO: 2.7 %
FERRITIN SERPL-MCNC: 415 NG/ML
GLUCOSE SERPL-MCNC: 109 MG/DL
HCT VFR BLD CALC: 42.1 %
HDLC SERPL-MCNC: 46 MG/DL
HGB BLD-MCNC: 13.6 G/DL
IMM GRANULOCYTES NFR BLD AUTO: 0 %
IRON SATN MFR SERPL: 28 %
IRON SERPL-MCNC: 87 UG/DL
LDLC SERPL CALC-MCNC: 117 MG/DL
LYMPHOCYTES # BLD AUTO: 1.03 K/UL
LYMPHOCYTES NFR BLD AUTO: 25.1 %
MAN DIFF?: NORMAL
MCHC RBC-ENTMCNC: 30.4 PG
MCHC RBC-ENTMCNC: 32.3 GM/DL
MCV RBC AUTO: 94 FL
MONOCYTES # BLD AUTO: 0.3 K/UL
MONOCYTES NFR BLD AUTO: 7.3 %
NEUTROPHILS # BLD AUTO: 2.65 K/UL
NEUTROPHILS NFR BLD AUTO: 64.4 %
NONHDLC SERPL-MCNC: 137 MG/DL
PLATELET # BLD AUTO: 114 K/UL
POTASSIUM SERPL-SCNC: 4.2 MMOL/L
PROT SERPL-MCNC: 6.6 G/DL
RBC # BLD: 4.48 M/UL
RBC # FLD: 14.2 %
SODIUM SERPL-SCNC: 140 MMOL/L
TIBC SERPL-MCNC: 313 UG/DL
TRIGL SERPL-MCNC: 106 MG/DL
UIBC SERPL-MCNC: 226 UG/DL
WBC # FLD AUTO: 4.11 K/UL

## 2024-07-22 ENCOUNTER — APPOINTMENT (OUTPATIENT)
Dept: INTERNAL MEDICINE | Facility: CLINIC | Age: 84
End: 2024-07-22
Payer: MEDICARE

## 2024-07-22 ENCOUNTER — NON-APPOINTMENT (OUTPATIENT)
Age: 84
End: 2024-07-22

## 2024-07-22 VITALS — DIASTOLIC BLOOD PRESSURE: 80 MMHG | SYSTOLIC BLOOD PRESSURE: 120 MMHG

## 2024-07-22 VITALS
DIASTOLIC BLOOD PRESSURE: 74 MMHG | HEIGHT: 71 IN | SYSTOLIC BLOOD PRESSURE: 126 MMHG | BODY MASS INDEX: 25.06 KG/M2 | WEIGHT: 179 LBS | OXYGEN SATURATION: 97 % | RESPIRATION RATE: 12 BRPM | HEART RATE: 60 BPM

## 2024-07-22 DIAGNOSIS — I50.9 HEART FAILURE, UNSPECIFIED: ICD-10-CM

## 2024-07-22 DIAGNOSIS — Z00.00 ENCOUNTER FOR GENERAL ADULT MEDICAL EXAMINATION W/OUT ABNORMAL FINDINGS: ICD-10-CM

## 2024-07-22 DIAGNOSIS — C61 MALIGNANT NEOPLASM OF PROSTATE: ICD-10-CM

## 2024-07-22 DIAGNOSIS — E78.5 HYPERLIPIDEMIA, UNSPECIFIED: ICD-10-CM

## 2024-07-22 DIAGNOSIS — D69.3 IMMUNE THROMBOCYTOPENIC PURPURA: ICD-10-CM

## 2024-07-22 DIAGNOSIS — Z95.0 PRESENCE OF CARDIAC PACEMAKER: ICD-10-CM

## 2024-07-22 DIAGNOSIS — Z95.2 PRESENCE OF PROSTHETIC HEART VALVE: ICD-10-CM

## 2024-07-22 PROCEDURE — G0439: CPT

## 2024-07-22 PROCEDURE — 99213 OFFICE O/P EST LOW 20 MIN: CPT | Mod: 25

## 2024-07-22 PROCEDURE — G0444 DEPRESSION SCREEN ANNUAL: CPT | Mod: 59

## 2024-07-22 PROCEDURE — 36415 COLL VENOUS BLD VENIPUNCTURE: CPT

## 2024-07-22 PROCEDURE — 93000 ELECTROCARDIOGRAM COMPLETE: CPT | Mod: 59

## 2024-07-22 PROCEDURE — G2211 COMPLEX E/M VISIT ADD ON: CPT | Mod: NC

## 2024-07-22 NOTE — ASSESSMENT
[FreeTextEntry1] : Stable Continue present medication Check routine blood work Advised regular aerobic activity and heart healthy diet

## 2024-07-22 NOTE — PHYSICAL EXAM
[No Acute Distress] : no acute distress [Well Nourished] : well nourished [Well Developed] : well developed [Normal Sclera/Conjunctiva] : normal sclera/conjunctiva [PERRL] : pupils equal round and reactive to light [EOMI] : extraocular movements intact [Normal Outer Ear/Nose] : the outer ears and nose were normal in appearance [Normal Oropharynx] : the oropharynx was normal [No Lymphadenopathy] : no lymphadenopathy [Thyroid Normal, No Nodules] : the thyroid was normal and there were no nodules present [No Carotid Bruits] : no carotid bruits [No Abdominal Bruit] : a ~M bruit was not heard ~T in the abdomen [No Varicosities] : no varicosities [No Edema] : there was no peripheral edema [No Palpable Aorta] : no palpable aorta [Normal Appearance] : normal in appearance [Normal] : soft, non-tender, non-distended, no masses palpated, no HSM and normal bowel sounds [No CVA Tenderness] : no CVA  tenderness [No Spinal Tenderness] : no spinal tenderness [No Rash] : no rash [Coordination Grossly Intact] : coordination grossly intact [No Focal Deficits] : no focal deficits [Normal Gait] : normal gait [Normal Affect] : the affect was normal [Normal Insight/Judgement] : insight and judgment were intact

## 2024-07-22 NOTE — HEALTH RISK ASSESSMENT
[Little interest or pleasure doing things] : 1) Little interest or pleasure doing things [Feeling down, depressed, or hopeless] : 2) Feeling down, depressed, or hopeless [0] : 2) Feeling down, depressed, or hopeless: Not at all (0) [PHQ-2 Negative - No further assessment needed] : PHQ-2 Negative - No further assessment needed [[id number]] : 0 [ColonoscopyComments] : Age not appropriate

## 2024-07-22 NOTE — HISTORY OF PRESENT ILLNESS
[de-identified] : Patient with history of hyperlipidemia, status post mitral valve replacement, history of prostate cancer, chronic ITP, permanent pacemaker placement and history of congestive heart failure presents to office for annual wellness exam and routine follow-up Overall he feels well and denies any exertional chest pain, shortness of breath, palpitations, change in bowel habits, rectal bleeding or melenic stools. He recently saw his urologist Dr. Chaz Ward.

## 2024-07-23 LAB
ALBUMIN SERPL ELPH-MCNC: 4.4 G/DL
ALP BLD-CCNC: 144 U/L
ALT SERPL-CCNC: 20 U/L
ANION GAP SERPL CALC-SCNC: 11 MMOL/L
APPEARANCE: CLEAR
AST SERPL-CCNC: 27 U/L
BACTERIA: NEGATIVE /HPF
BASOPHILS # BLD AUTO: 0.01 K/UL
BASOPHILS NFR BLD AUTO: 0.3 %
BILIRUB SERPL-MCNC: 1 MG/DL
BILIRUBIN URINE: NEGATIVE
BLOOD URINE: NEGATIVE
BUN SERPL-MCNC: 20 MG/DL
CALCIUM SERPL-MCNC: 9.7 MG/DL
CAST: 0 /LPF
CHLORIDE SERPL-SCNC: 105 MMOL/L
CHOLEST SERPL-MCNC: 192 MG/DL
CO2 SERPL-SCNC: 26 MMOL/L
COLOR: YELLOW
CREAT SERPL-MCNC: 1.26 MG/DL
EGFR: 57 ML/MIN/1.73M2
EOSINOPHIL # BLD AUTO: 0.07 K/UL
EOSINOPHIL NFR BLD AUTO: 1.8 %
EPITHELIAL CELLS: 0 /HPF
GLUCOSE QUALITATIVE U: NEGATIVE MG/DL
GLUCOSE SERPL-MCNC: 110 MG/DL
HCT VFR BLD CALC: 45.3 %
HDLC SERPL-MCNC: 47 MG/DL
HGB BLD-MCNC: 14.5 G/DL
IMM GRANULOCYTES NFR BLD AUTO: 0.3 %
KETONES URINE: NEGATIVE MG/DL
LDLC SERPL CALC-MCNC: 126 MG/DL
LEUKOCYTE ESTERASE URINE: NEGATIVE
LYMPHOCYTES # BLD AUTO: 0.93 K/UL
LYMPHOCYTES NFR BLD AUTO: 23.7 %
MAN DIFF?: NORMAL
MCHC RBC-ENTMCNC: 30.1 PG
MCHC RBC-ENTMCNC: 32 GM/DL
MCV RBC AUTO: 94.2 FL
MICROSCOPIC-UA: NORMAL
MONOCYTES # BLD AUTO: 0.31 K/UL
MONOCYTES NFR BLD AUTO: 7.9 %
NEUTROPHILS # BLD AUTO: 2.6 K/UL
NEUTROPHILS NFR BLD AUTO: 66 %
NITRITE URINE: NEGATIVE
NONHDLC SERPL-MCNC: 145 MG/DL
PH URINE: 5.5
PLATELET # BLD AUTO: 111 K/UL
POTASSIUM SERPL-SCNC: 4.4 MMOL/L
PROT SERPL-MCNC: 7 G/DL
PROTEIN URINE: NEGATIVE MG/DL
PSA SERPL-MCNC: 1.4 NG/ML
RBC # BLD: 4.81 M/UL
RBC # FLD: 13.8 %
RED BLOOD CELLS URINE: 0 /HPF
SODIUM SERPL-SCNC: 142 MMOL/L
SPECIFIC GRAVITY URINE: 1.01
TRIGL SERPL-MCNC: 104 MG/DL
UROBILINOGEN URINE: 0.2 MG/DL
WBC # FLD AUTO: 3.93 K/UL
WHITE BLOOD CELLS URINE: 0 /HPF

## 2024-07-25 ENCOUNTER — LABORATORY RESULT (OUTPATIENT)
Age: 84
End: 2024-07-25

## 2024-11-07 ENCOUNTER — APPOINTMENT (OUTPATIENT)
Dept: INTERNAL MEDICINE | Facility: CLINIC | Age: 84
End: 2024-11-07
Payer: MEDICARE

## 2024-11-07 VITALS
DIASTOLIC BLOOD PRESSURE: 69 MMHG | BODY MASS INDEX: 25.34 KG/M2 | WEIGHT: 181 LBS | RESPIRATION RATE: 12 BRPM | OXYGEN SATURATION: 98 % | HEART RATE: 61 BPM | SYSTOLIC BLOOD PRESSURE: 110 MMHG | HEIGHT: 71 IN

## 2024-11-07 DIAGNOSIS — I50.9 HEART FAILURE, UNSPECIFIED: ICD-10-CM

## 2024-11-07 DIAGNOSIS — R41.89 OTHER SYMPTOMS AND SIGNS INVOLVING COGNITIVE FUNCTIONS AND AWARENESS: ICD-10-CM

## 2024-11-07 DIAGNOSIS — D69.3 IMMUNE THROMBOCYTOPENIC PURPURA: ICD-10-CM

## 2024-11-07 PROCEDURE — 99214 OFFICE O/P EST MOD 30 MIN: CPT

## 2024-11-07 PROCEDURE — 36415 COLL VENOUS BLD VENIPUNCTURE: CPT

## 2024-11-07 RX ORDER — DONEPEZIL HYDROCHLORIDE 5 MG/1
5 TABLET ORAL
Qty: 90 | Refills: 3 | Status: ACTIVE | COMMUNITY
Start: 2024-11-07 | End: 1900-01-01

## 2024-11-08 LAB
ALBUMIN SERPL ELPH-MCNC: 4 G/DL
ALP BLD-CCNC: 141 U/L
ALT SERPL-CCNC: 11 U/L
ANION GAP SERPL CALC-SCNC: 12 MMOL/L
AST SERPL-CCNC: 20 U/L
BASOPHILS # BLD AUTO: 0.02 K/UL
BASOPHILS NFR BLD AUTO: 0.5 %
BILIRUB SERPL-MCNC: 0.8 MG/DL
BUN SERPL-MCNC: 19 MG/DL
CALCIUM SERPL-MCNC: 9.5 MG/DL
CHLORIDE SERPL-SCNC: 103 MMOL/L
CO2 SERPL-SCNC: 26 MMOL/L
CREAT SERPL-MCNC: 1.16 MG/DL
EGFR: 62 ML/MIN/1.73M2
EOSINOPHIL # BLD AUTO: 0.11 K/UL
EOSINOPHIL NFR BLD AUTO: 2.6 %
ESTIMATED AVERAGE GLUCOSE: 126 MG/DL
FOLATE SERPL-MCNC: >20 NG/ML
GLUCOSE SERPL-MCNC: 125 MG/DL
HBA1C MFR BLD HPLC: 6 %
HCT VFR BLD CALC: 41.7 %
HGB BLD-MCNC: 13.7 G/DL
IMM GRANULOCYTES NFR BLD AUTO: 0 %
LYMPHOCYTES # BLD AUTO: 1.2 K/UL
LYMPHOCYTES NFR BLD AUTO: 28.6 %
MAN DIFF?: NORMAL
MCHC RBC-ENTMCNC: 30.6 PG
MCHC RBC-ENTMCNC: 32.9 G/DL
MCV RBC AUTO: 93.1 FL
MONOCYTES # BLD AUTO: 0.32 K/UL
MONOCYTES NFR BLD AUTO: 7.6 %
NEUTROPHILS # BLD AUTO: 2.54 K/UL
NEUTROPHILS NFR BLD AUTO: 60.7 %
PLATELET # BLD AUTO: 117 K/UL
POTASSIUM SERPL-SCNC: 4.2 MMOL/L
PROT SERPL-MCNC: 6.7 G/DL
RBC # BLD: 4.48 M/UL
RBC # FLD: 13.8 %
SODIUM SERPL-SCNC: 140 MMOL/L
T PALLIDUM AB SER QL IA: NEGATIVE
T3 SERPL-MCNC: 81 NG/DL
T4 FREE SERPL-MCNC: 1.2 NG/DL
TSH SERPL-ACNC: 1.41 UIU/ML
VIT B12 SERPL-MCNC: 343 PG/ML
WBC # FLD AUTO: 4.19 K/UL

## 2025-02-10 ENCOUNTER — APPOINTMENT (OUTPATIENT)
Dept: INTERNAL MEDICINE | Facility: CLINIC | Age: 85
End: 2025-02-10
Payer: MEDICARE

## 2025-02-10 VITALS
DIASTOLIC BLOOD PRESSURE: 82 MMHG | HEIGHT: 71 IN | SYSTOLIC BLOOD PRESSURE: 134 MMHG | HEART RATE: 62 BPM | BODY MASS INDEX: 25.34 KG/M2 | OXYGEN SATURATION: 96 % | WEIGHT: 181 LBS | RESPIRATION RATE: 12 BRPM

## 2025-02-10 VITALS — DIASTOLIC BLOOD PRESSURE: 80 MMHG | SYSTOLIC BLOOD PRESSURE: 120 MMHG

## 2025-02-10 DIAGNOSIS — I50.30 UNSPECIFIED DIASTOLIC (CONGESTIVE) HEART FAILURE: ICD-10-CM

## 2025-02-10 DIAGNOSIS — D69.3 IMMUNE THROMBOCYTOPENIC PURPURA: ICD-10-CM

## 2025-02-10 DIAGNOSIS — I50.9 HEART FAILURE, UNSPECIFIED: ICD-10-CM

## 2025-02-10 DIAGNOSIS — I48.91 UNSPECIFIED ATRIAL FIBRILLATION: ICD-10-CM

## 2025-02-10 DIAGNOSIS — E78.5 HYPERLIPIDEMIA, UNSPECIFIED: ICD-10-CM

## 2025-02-10 DIAGNOSIS — R73.03 PREDIABETES.: ICD-10-CM

## 2025-02-10 DIAGNOSIS — C61 MALIGNANT NEOPLASM OF PROSTATE: ICD-10-CM

## 2025-02-10 PROCEDURE — 99214 OFFICE O/P EST MOD 30 MIN: CPT

## 2025-02-10 PROCEDURE — G2211 COMPLEX E/M VISIT ADD ON: CPT

## 2025-02-10 PROCEDURE — 36415 COLL VENOUS BLD VENIPUNCTURE: CPT

## 2025-02-11 LAB
ALBUMIN SERPL ELPH-MCNC: 3.9 G/DL
ALP BLD-CCNC: 134 U/L
ALT SERPL-CCNC: 18 U/L
ANION GAP SERPL CALC-SCNC: 8 MMOL/L
AST SERPL-CCNC: 25 U/L
BILIRUB SERPL-MCNC: 0.8 MG/DL
BUN SERPL-MCNC: 22 MG/DL
CALCIUM SERPL-MCNC: 10.2 MG/DL
CHLORIDE SERPL-SCNC: 103 MMOL/L
CHOLEST SERPL-MCNC: 173 MG/DL
CO2 SERPL-SCNC: 29 MMOL/L
CREAT SERPL-MCNC: 1.18 MG/DL
EGFR: 61 ML/MIN/1.73M2
ESTIMATED AVERAGE GLUCOSE: 131 MG/DL
GLUCOSE SERPL-MCNC: 104 MG/DL
GLUCOSE SERPL-MCNC: 109 MG/DL
HBA1C MFR BLD HPLC: 6.2 %
HDLC SERPL-MCNC: 49 MG/DL
LDLC SERPL CALC-MCNC: 108 MG/DL
NONHDLC SERPL-MCNC: 124 MG/DL
POTASSIUM SERPL-SCNC: 4.1 MMOL/L
PROT SERPL-MCNC: 6.2 G/DL
SODIUM SERPL-SCNC: 139 MMOL/L
TRIGL SERPL-MCNC: 90 MG/DL

## 2025-07-29 ENCOUNTER — APPOINTMENT (OUTPATIENT)
Age: 85
End: 2025-07-29
Payer: MEDICARE

## 2025-07-29 VITALS
HEIGHT: 71 IN | DIASTOLIC BLOOD PRESSURE: 72 MMHG | HEART RATE: 59 BPM | BODY MASS INDEX: 24.36 KG/M2 | WEIGHT: 174 LBS | SYSTOLIC BLOOD PRESSURE: 116 MMHG | RESPIRATION RATE: 12 BRPM | OXYGEN SATURATION: 97 %

## 2025-07-29 DIAGNOSIS — R73.03 PREDIABETES.: ICD-10-CM

## 2025-07-29 DIAGNOSIS — I48.91 UNSPECIFIED ATRIAL FIBRILLATION: ICD-10-CM

## 2025-07-29 DIAGNOSIS — F41.9 ANXIETY DISORDER, UNSPECIFIED: ICD-10-CM

## 2025-07-29 DIAGNOSIS — D69.3 IMMUNE THROMBOCYTOPENIC PURPURA: ICD-10-CM

## 2025-07-29 DIAGNOSIS — Z00.00 ENCOUNTER FOR GENERAL ADULT MEDICAL EXAMINATION W/OUT ABNORMAL FINDINGS: ICD-10-CM

## 2025-07-29 DIAGNOSIS — E78.5 HYPERLIPIDEMIA, UNSPECIFIED: ICD-10-CM

## 2025-07-29 PROCEDURE — G0444 DEPRESSION SCREEN ANNUAL: CPT | Mod: 59

## 2025-07-29 PROCEDURE — 36415 COLL VENOUS BLD VENIPUNCTURE: CPT

## 2025-07-29 PROCEDURE — 99214 OFFICE O/P EST MOD 30 MIN: CPT | Mod: 25

## 2025-07-29 PROCEDURE — G0439: CPT

## 2025-07-29 PROCEDURE — 93000 ELECTROCARDIOGRAM COMPLETE: CPT | Mod: 59

## 2025-07-31 LAB
ALBUMIN SERPL ELPH-MCNC: 4.3 G/DL
ALP BLD-CCNC: 146 U/L
ALT SERPL-CCNC: 22 U/L
ANION GAP SERPL CALC-SCNC: 14 MMOL/L
APPEARANCE: CLEAR
AST SERPL-CCNC: 29 U/L
BACTERIA: NEGATIVE /HPF
BASOPHILS # BLD AUTO: 0.02 K/UL
BASOPHILS NFR BLD AUTO: 0.5 %
BILIRUB SERPL-MCNC: 0.8 MG/DL
BILIRUBIN URINE: NEGATIVE
BLOOD URINE: NEGATIVE
BUN SERPL-MCNC: 19 MG/DL
CALCIUM SERPL-MCNC: 9.7 MG/DL
CAST: 0 /LPF
CHLORIDE SERPL-SCNC: 103 MMOL/L
CHOLEST SERPL-MCNC: 202 MG/DL
CO2 SERPL-SCNC: 24 MMOL/L
COLOR: YELLOW
CREAT SERPL-MCNC: 1.27 MG/DL
EGFRCR SERPLBLD CKD-EPI 2021: 56 ML/MIN/1.73M2
EOSINOPHIL # BLD AUTO: 0.06 K/UL
EOSINOPHIL NFR BLD AUTO: 1.6 %
EPITHELIAL CELLS: 0 /HPF
ESTIMATED AVERAGE GLUCOSE: 131 MG/DL
GLUCOSE QUALITATIVE U: NEGATIVE MG/DL
GLUCOSE SERPL-MCNC: 104 MG/DL
GLUCOSE SERPL-MCNC: 112 MG/DL
HBA1C MFR BLD HPLC: 6.2 %
HCT VFR BLD CALC: 41.6 %
HDLC SERPL-MCNC: 52 MG/DL
HGB BLD-MCNC: 13.4 G/DL
IMM GRANULOCYTES NFR BLD AUTO: 0.3 %
KETONES URINE: NEGATIVE MG/DL
LDLC SERPL-MCNC: 133 MG/DL
LEUKOCYTE ESTERASE URINE: NEGATIVE
LYMPHOCYTES # BLD AUTO: 0.79 K/UL
LYMPHOCYTES NFR BLD AUTO: 21.6 %
MAN DIFF?: NORMAL
MCHC RBC-ENTMCNC: 29.9 PG
MCHC RBC-ENTMCNC: 32.2 G/DL
MCV RBC AUTO: 92.9 FL
MICROSCOPIC-UA: NORMAL
MONOCYTES # BLD AUTO: 0.26 K/UL
MONOCYTES NFR BLD AUTO: 7.1 %
NEUTROPHILS # BLD AUTO: 2.52 K/UL
NEUTROPHILS NFR BLD AUTO: 68.9 %
NITRITE URINE: NEGATIVE
NONHDLC SERPL-MCNC: 150 MG/DL
PH URINE: 5.5
PLATELET # BLD AUTO: 124 K/UL
POTASSIUM SERPL-SCNC: 4.2 MMOL/L
PROT SERPL-MCNC: 6.7 G/DL
PROTEIN URINE: NEGATIVE MG/DL
PSA SERPL-MCNC: 2.09 NG/ML
RBC # BLD: 4.48 M/UL
RBC # FLD: 13.9 %
RED BLOOD CELLS URINE: 0 /HPF
SODIUM SERPL-SCNC: 140 MMOL/L
SPECIFIC GRAVITY URINE: 1.01
TRIGL SERPL-MCNC: 90 MG/DL
UROBILINOGEN URINE: 0.2 MG/DL
WBC # FLD AUTO: 3.66 K/UL
WHITE BLOOD CELLS URINE: 0 /HPF

## 2025-08-05 LAB — HEMOCCULT STL QL IA: NEGATIVE
